# Patient Record
Sex: FEMALE | Race: WHITE
[De-identification: names, ages, dates, MRNs, and addresses within clinical notes are randomized per-mention and may not be internally consistent; named-entity substitution may affect disease eponyms.]

---

## 2021-12-14 ENCOUNTER — HOSPITAL ENCOUNTER (OUTPATIENT)
Age: 41
End: 2021-12-14
Payer: COMMERCIAL

## 2021-12-14 DIAGNOSIS — Z98.891: ICD-10-CM

## 2021-12-14 DIAGNOSIS — Z87.59: ICD-10-CM

## 2021-12-14 DIAGNOSIS — E06.3: ICD-10-CM

## 2021-12-14 DIAGNOSIS — O09.90: Primary | ICD-10-CM

## 2021-12-14 DIAGNOSIS — E28.2: ICD-10-CM

## 2021-12-14 DIAGNOSIS — O09.529: ICD-10-CM

## 2021-12-14 DIAGNOSIS — Z3A.00: ICD-10-CM

## 2021-12-14 DIAGNOSIS — N96: ICD-10-CM

## 2021-12-14 DIAGNOSIS — O26.899: ICD-10-CM

## 2021-12-14 DIAGNOSIS — F32.A: ICD-10-CM

## 2021-12-14 DIAGNOSIS — Z67.91: ICD-10-CM

## 2021-12-14 LAB
AMPHET UR-MCNC: NEGATIVE NG/ML
BARBITURATE URINE VISTA: NEGATIVE
BENZODIAZEPINE URINE VISTA: NEGATIVE
COCAINE URINE VISTA: NEGATIVE
DEPRECATED RDW RBC: 46.9 FL (ref 35.1–43.9)
DRUG CONFIRMATION TO FOLLOW?: (no result)
ECSTACY URINE VISTA: NEGATIVE
ERYTHROCYTE [DISTWIDTH] IN BLOOD: 13.7 % (ref 11.6–14.6)
HCT VFR BLD AUTO: 37.6 % (ref 37–47)
HEMOGLOBIN: 12.5 G/DL (ref 12–15)
HGB BLD-MCNC: 12.5 G/DL (ref 12–15)
IMMATURE GRANULOCYTES COUNT: 0.05 X10^3/UL (ref 0–0)
MCV RBC: 91.7 FL (ref 81–99)
MEAN CORP HGB CONC: 33.2 G/DL (ref 32–36)
MEAN PLATELET VOL.: 8.3 FL (ref 6.2–12)
METHADONE URINE VISTA: NEGATIVE
NRBC FLAGGED BY ANALYZER: 0 % (ref 0–5)
PCP UR QL: NEGATIVE
PH UR: 5 [PH]
PLATELET # BLD: 375 K/MM3 (ref 150–450)
PLATELET COUNT: 375 K/MM3 (ref 150–450)
RBC # BLD AUTO: 4.1 M/MM3 (ref 4.2–5.4)
RBC DISTRIBUTION WIDTH CV: 13.7 % (ref 11.6–14.6)
RBC DISTRIBUTION WIDTH SD: 46.9 FL (ref 35.1–43.9)
THC URINE VISTA: NEGATIVE
WBC # BLD AUTO: 7.9 K/MM3 (ref 4.4–11)
WHITE BLOOD COUNT: 7.9 K/MM3 (ref 4.4–11)

## 2021-12-14 PROCEDURE — 86901 BLOOD TYPING SEROLOGIC RH(D): CPT

## 2021-12-14 PROCEDURE — 36415 COLL VENOUS BLD VENIPUNCTURE: CPT

## 2021-12-14 PROCEDURE — 87340 HEPATITIS B SURFACE AG IA: CPT

## 2021-12-14 PROCEDURE — 87088 URINE BACTERIA CULTURE: CPT

## 2021-12-14 PROCEDURE — 86762 RUBELLA ANTIBODY: CPT

## 2021-12-14 PROCEDURE — 86900 BLOOD TYPING SEROLOGIC ABO: CPT

## 2021-12-14 PROCEDURE — 84443 ASSAY THYROID STIM HORMONE: CPT

## 2021-12-14 PROCEDURE — 86146 BETA-2 GLYCOPROTEIN ANTIBODY: CPT

## 2021-12-14 PROCEDURE — 84439 ASSAY OF FREE THYROXINE: CPT

## 2021-12-14 PROCEDURE — 86803 HEPATITIS C AB TEST: CPT

## 2021-12-14 PROCEDURE — 87624 HPV HI-RISK TYP POOLED RSLT: CPT

## 2021-12-14 PROCEDURE — 86376 MICROSOMAL ANTIBODY EACH: CPT

## 2021-12-14 PROCEDURE — 87086 URINE CULTURE/COLONY COUNT: CPT

## 2021-12-14 PROCEDURE — 80307 DRUG TEST PRSMV CHEM ANLYZR: CPT

## 2021-12-14 PROCEDURE — 86703 HIV-1/HIV-2 1 RESULT ANTBDY: CPT

## 2021-12-14 PROCEDURE — 86147 CARDIOLIPIN ANTIBODY EA IG: CPT

## 2021-12-14 PROCEDURE — G0145 SCR C/V CYTO,THINLAYER,RESCR: HCPCS

## 2021-12-14 PROCEDURE — 87491 CHLMYD TRACH DNA AMP PROBE: CPT

## 2021-12-14 PROCEDURE — 86800 THYROGLOBULIN ANTIBODY: CPT

## 2021-12-14 PROCEDURE — 88175 CYTOPATH C/V AUTO FLUID REDO: CPT

## 2021-12-14 PROCEDURE — 85025 COMPLETE CBC W/AUTO DIFF WBC: CPT

## 2021-12-14 PROCEDURE — 86780 TREPONEMA PALLIDUM: CPT

## 2021-12-14 PROCEDURE — 86850 RBC ANTIBODY SCREEN: CPT

## 2021-12-14 PROCEDURE — 87591 N.GONORRHOEAE DNA AMP PROB: CPT

## 2021-12-16 LAB
ANTI-CARDIOLIPIN AB, IGA, QN: < 9 APL U/ML (ref 0–11)
ANTI-CARDIOLIPIN AB, IGG, QN: < 9 GPL U/ML (ref 0–14)
ANTI-CARDIOLIPIN AB, IGM, QN: < 9 MPL U/ML (ref 0–12)
B2 GLYCOPROT1 IGA SER QL: <9 (ref 0–25)
B2 GLYCOPROT1 IGG SER QL: <9 (ref 0–20)
B2 GLYCOPROT1 IGM SER QL: <9 (ref 0–32)
CARDIOLIPIN IGA SERPL-ACNC: < 9 APL U/ML (ref 0–11)
CARDIOLIPIN IGG SER IA-ACNC: < 9 GPL U/ML (ref 0–14)
DILUTE RUSSELL VIPER VENOM: 29.4 SEC (ref 0–47)
PTT-LA: 30.4 SEC (ref 0–51.9)
SCREEN DRVVT: 29.4 SEC (ref 0–47)

## 2022-01-04 ENCOUNTER — HOSPITAL ENCOUNTER (OUTPATIENT)
Age: 42
Discharge: ANOTHER HOSPITAL | End: 2022-01-04
Payer: COMMERCIAL

## 2022-01-04 DIAGNOSIS — E06.3: Primary | ICD-10-CM

## 2022-01-04 PROCEDURE — 84443 ASSAY THYROID STIM HORMONE: CPT

## 2022-01-04 PROCEDURE — 36415 COLL VENOUS BLD VENIPUNCTURE: CPT

## 2022-01-04 PROCEDURE — 85025 COMPLETE CBC W/AUTO DIFF WBC: CPT

## 2022-01-31 ENCOUNTER — HOSPITAL ENCOUNTER (OUTPATIENT)
Dept: HOSPITAL 100 - PAVLAB | Age: 42
Discharge: ANOTHER HOSPITAL | End: 2022-01-31
Payer: COMMERCIAL

## 2022-01-31 DIAGNOSIS — E28.2: Primary | ICD-10-CM

## 2022-01-31 LAB
DEPRECATED RDW RBC: 42.6 FL (ref 35.1–43.9)
ERYTHROCYTE [DISTWIDTH] IN BLOOD: 12.6 % (ref 11.6–14.6)
GLUCOSE 1H P 50 G GLC PO SERPL-MCNC: 62 MG/DL (ref 70–140)
HCT VFR BLD AUTO: 39.8 % (ref 37–47)
HEMOGLOBIN: 13.2 G/DL (ref 12–15)
HGB BLD-MCNC: 13.2 G/DL (ref 12–15)
IMMATURE GRANULOCYTES COUNT: 0.06 X10^3/UL (ref 0–0)
MCV RBC: 91.9 FL (ref 81–99)
MEAN CORP HGB CONC: 33.2 G/DL (ref 32–36)
MEAN PLATELET VOL.: 8.4 FL (ref 6.2–12)
NRBC FLAGGED BY ANALYZER: 0 % (ref 0–5)
PLATELET # BLD: 368 K/MM3 (ref 150–450)
PLATELET COUNT: 368 K/MM3 (ref 150–450)
RBC # BLD AUTO: 4.33 M/MM3 (ref 4.2–5.4)
RBC DISTRIBUTION WIDTH CV: 12.6 % (ref 11.6–14.6)
RBC DISTRIBUTION WIDTH SD: 42.6 FL (ref 35.1–43.9)
WBC # BLD AUTO: 10.3 K/MM3 (ref 4.4–11)
WHITE BLOOD COUNT: 10.3 K/MM3 (ref 4.4–11)

## 2022-01-31 PROCEDURE — 82950 GLUCOSE TEST: CPT

## 2022-01-31 PROCEDURE — 36415 COLL VENOUS BLD VENIPUNCTURE: CPT

## 2022-03-03 ENCOUNTER — HOSPITAL ENCOUNTER (OUTPATIENT)
Dept: HOSPITAL 100 - PAVLAB | Age: 42
Discharge: HOME | End: 2022-03-03
Payer: COMMERCIAL

## 2022-03-03 DIAGNOSIS — E06.3: Primary | ICD-10-CM

## 2022-03-03 PROCEDURE — 36415 COLL VENOUS BLD VENIPUNCTURE: CPT

## 2022-03-03 PROCEDURE — 84443 ASSAY THYROID STIM HORMONE: CPT

## 2022-03-03 PROCEDURE — 84439 ASSAY OF FREE THYROXINE: CPT

## 2022-04-29 ENCOUNTER — HOSPITAL ENCOUNTER (OUTPATIENT)
Dept: HOSPITAL 100 - LAB | Age: 42
Discharge: HOME | End: 2022-04-29
Payer: COMMERCIAL

## 2022-04-29 DIAGNOSIS — O99.280: ICD-10-CM

## 2022-04-29 DIAGNOSIS — Z3A.00: ICD-10-CM

## 2022-04-29 DIAGNOSIS — E06.3: ICD-10-CM

## 2022-04-29 DIAGNOSIS — O09.90: Primary | ICD-10-CM

## 2022-04-29 LAB
DEPRECATED RDW RBC: 45.8 FL (ref 35.1–43.9)
ERYTHROCYTE [DISTWIDTH] IN BLOOD: 13.3 % (ref 11.6–14.6)
GLUCOSE 1H P 50 G GLC PO SERPL-MCNC: 124 MG/DL (ref 70–140)
HCT VFR BLD AUTO: 36 % (ref 37–47)
HEMOGLOBIN: 12.1 G/DL (ref 12–15)
HGB BLD-MCNC: 12.1 G/DL (ref 12–15)
IMMATURE GRANULOCYTES COUNT: 0.12 X10^3/UL (ref 0–0)
MCV RBC: 94 FL (ref 81–99)
MEAN CORP HGB CONC: 33.6 G/DL (ref 32–36)
MEAN PLATELET VOL.: 8.5 FL (ref 6.2–12)
NRBC FLAGGED BY ANALYZER: 0 % (ref 0–5)
PLATELET # BLD: 362 K/MM3 (ref 150–450)
PLATELET COUNT: 362 K/MM3 (ref 150–450)
RBC # BLD AUTO: 3.83 M/MM3 (ref 4.2–5.4)
RBC DISTRIBUTION WIDTH CV: 13.3 % (ref 11.6–14.6)
RBC DISTRIBUTION WIDTH SD: 45.8 FL (ref 35.1–43.9)
WBC # BLD AUTO: 10.6 K/MM3 (ref 4.4–11)
WHITE BLOOD COUNT: 10.6 K/MM3 (ref 4.4–11)

## 2022-04-29 PROCEDURE — 86850 RBC ANTIBODY SCREEN: CPT

## 2022-04-29 PROCEDURE — 82950 GLUCOSE TEST: CPT

## 2022-04-29 PROCEDURE — 84443 ASSAY THYROID STIM HORMONE: CPT

## 2022-04-29 PROCEDURE — 86901 BLOOD TYPING SEROLOGIC RH(D): CPT

## 2022-04-29 PROCEDURE — 86900 BLOOD TYPING SEROLOGIC ABO: CPT

## 2022-04-29 PROCEDURE — 85025 COMPLETE CBC W/AUTO DIFF WBC: CPT

## 2022-04-29 PROCEDURE — 36415 COLL VENOUS BLD VENIPUNCTURE: CPT

## 2022-05-13 ENCOUNTER — HOSPITAL ENCOUNTER (OUTPATIENT)
Age: 42
Discharge: HOME | End: 2022-05-13
Payer: COMMERCIAL

## 2022-05-13 DIAGNOSIS — O16.9: Primary | ICD-10-CM

## 2022-05-13 DIAGNOSIS — Z3A.00: ICD-10-CM

## 2022-05-13 LAB
ALANINE AMINOTRANSFER ALT/SGPT: 17 U/L (ref 13–56)
ALBUMIN SERPL-MCNC: 2.7 G/DL (ref 3.2–5)
ALKALINE PHOSPHATASE: 107 U/L (ref 45–117)
ANION GAP: 7 (ref 5–15)
AST(SGOT): 12 U/L (ref 15–37)
BUN SERPL-MCNC: 6 MG/DL (ref 7–18)
BUN/CREAT RATIO: 12.7 RATIO (ref 10–20)
CALCIUM SERPL-MCNC: 8.9 MG/DL (ref 8.5–10.1)
CARBON DIOXIDE: 24 MMOL/L (ref 21–32)
CHLORIDE: 105 MMOL/L (ref 98–107)
CREAT UR-MCNC: 17 MG/DL
DEPRECATED RDW RBC: 44.7 FL (ref 35.1–43.9)
ERYTHROCYTE [DISTWIDTH] IN BLOOD: 13.1 % (ref 11.6–14.6)
EST GLOM FILT RATE - AFR AMER: 187 ML/MIN (ref 60–?)
GLOBULIN: 4.4 G/DL (ref 2.2–4.2)
GLUCOSE: 87 MG/DL (ref 74–106)
HCT VFR BLD AUTO: 37.9 % (ref 37–47)
HEMOGLOBIN: 12.7 G/DL (ref 12–15)
HGB BLD-MCNC: 12.7 G/DL (ref 12–15)
IMMATURE GRANULOCYTES COUNT: 0.17 X10^3/UL (ref 0–0)
MCV RBC: 93.1 FL (ref 81–99)
MEAN CORP HGB CONC: 33.5 G/DL (ref 32–36)
MEAN PLATELET VOL.: 8.6 FL (ref 6.2–12)
NRBC FLAGGED BY ANALYZER: 0 % (ref 0–5)
PLATELET # BLD: 393 K/MM3 (ref 150–450)
PLATELET COUNT: 393 K/MM3 (ref 150–450)
POTASSIUM: 3.7 MMOL/L (ref 3.5–5.1)
PROT UR-MCNC: < 6 MG/DL (ref ?–11.9)
PROT/CREAT UR: (no result) MG/G CRE (ref 0–200)
RBC # BLD AUTO: 4.07 M/MM3 (ref 4.2–5.4)
RBC DISTRIBUTION WIDTH CV: 13.1 % (ref 11.6–14.6)
RBC DISTRIBUTION WIDTH SD: 44.7 FL (ref 35.1–43.9)
WBC # BLD AUTO: 11.5 K/MM3 (ref 4.4–11)
WHITE BLOOD COUNT: 11.5 K/MM3 (ref 4.4–11)

## 2022-05-13 PROCEDURE — 82570 ASSAY OF URINE CREATININE: CPT

## 2022-05-13 PROCEDURE — 80053 COMPREHEN METABOLIC PANEL: CPT

## 2022-05-13 PROCEDURE — 36415 COLL VENOUS BLD VENIPUNCTURE: CPT

## 2022-05-13 PROCEDURE — 84156 ASSAY OF PROTEIN URINE: CPT

## 2022-05-13 PROCEDURE — 85025 COMPLETE CBC W/AUTO DIFF WBC: CPT

## 2022-06-10 ENCOUNTER — HOSPITAL ENCOUNTER (OUTPATIENT)
Age: 42
Discharge: HOME | End: 2022-06-10
Payer: COMMERCIAL

## 2022-06-10 DIAGNOSIS — O16.3: Primary | ICD-10-CM

## 2022-06-10 DIAGNOSIS — Z3A.00: ICD-10-CM

## 2022-06-10 LAB
ALANINE AMINOTRANSFER ALT/SGPT: 18 U/L (ref 13–56)
ALBUMIN SERPL-MCNC: 2.6 G/DL (ref 3.2–5)
ALKALINE PHOSPHATASE: 124 U/L (ref 45–117)
ANION GAP: 6 (ref 5–15)
AST(SGOT): 10 U/L (ref 15–37)
BUN SERPL-MCNC: 6 MG/DL (ref 7–18)
BUN/CREAT RATIO: 12.2 RATIO (ref 10–20)
CALCIUM SERPL-MCNC: 8.8 MG/DL (ref 8.5–10.1)
CARBON DIOXIDE: 25 MMOL/L (ref 21–32)
CHLORIDE: 105 MMOL/L (ref 98–107)
CREAT UR-MCNC: 17.5 MG/DL
DEPRECATED RDW RBC: 46.2 FL (ref 35.1–43.9)
ERYTHROCYTE [DISTWIDTH] IN BLOOD: 13.5 % (ref 11.6–14.6)
EST GLOM FILT RATE - AFR AMER: 179 ML/MIN (ref 60–?)
GLOBULIN: 4.3 G/DL (ref 2.2–4.2)
GLUCOSE: 89 MG/DL (ref 74–106)
HCT VFR BLD AUTO: 39.6 % (ref 37–47)
HEMOGLOBIN: 13.1 G/DL (ref 12–15)
HGB BLD-MCNC: 13.1 G/DL (ref 12–15)
IMMATURE GRANULOCYTES COUNT: 0.23 X10^3/UL (ref 0–0)
MCV RBC: 94.1 FL (ref 81–99)
MEAN CORP HGB CONC: 33.1 G/DL (ref 32–36)
MEAN PLATELET VOL.: 8.5 FL (ref 6.2–12)
NRBC FLAGGED BY ANALYZER: 0 % (ref 0–5)
PLATELET # BLD: 367 K/MM3 (ref 150–450)
PLATELET COUNT: 367 K/MM3 (ref 150–450)
POTASSIUM: 3.6 MMOL/L (ref 3.5–5.1)
PROT UR-MCNC: < 6 MG/DL (ref ?–11.9)
PROT/CREAT UR: 286 MG/G CRE (ref 0–200)
RBC # BLD AUTO: 4.21 M/MM3 (ref 4.2–5.4)
RBC DISTRIBUTION WIDTH CV: 13.5 % (ref 11.6–14.6)
RBC DISTRIBUTION WIDTH SD: 46.2 FL (ref 35.1–43.9)
WBC # BLD AUTO: 13.8 K/MM3 (ref 4.4–11)
WHITE BLOOD COUNT: 13.8 K/MM3 (ref 4.4–11)

## 2022-06-10 PROCEDURE — 82570 ASSAY OF URINE CREATININE: CPT

## 2022-06-10 PROCEDURE — 85025 COMPLETE CBC W/AUTO DIFF WBC: CPT

## 2022-06-10 PROCEDURE — 36415 COLL VENOUS BLD VENIPUNCTURE: CPT

## 2022-06-10 PROCEDURE — 80053 COMPREHEN METABOLIC PANEL: CPT

## 2022-06-10 PROCEDURE — 84156 ASSAY OF PROTEIN URINE: CPT

## 2022-06-17 ENCOUNTER — HOSPITAL ENCOUNTER (OUTPATIENT)
Dept: HOSPITAL 100 - PAVLAB | Age: 42
Discharge: HOME | End: 2022-06-17
Payer: COMMERCIAL

## 2022-06-17 DIAGNOSIS — O09.522: Primary | ICD-10-CM

## 2022-06-17 DIAGNOSIS — Z3A.00: ICD-10-CM

## 2022-06-17 LAB
ALANINE AMINOTRANSFER ALT/SGPT: 18 U/L (ref 13–56)
ALBUMIN SERPL-MCNC: 2.5 G/DL (ref 3.2–5)
ALKALINE PHOSPHATASE: 129 U/L (ref 45–117)
ANION GAP: 6 (ref 5–15)
AST(SGOT): 14 U/L (ref 15–37)
BUN SERPL-MCNC: 6 MG/DL (ref 7–18)
BUN/CREAT RATIO: 11.1 RATIO (ref 10–20)
CALCIUM SERPL-MCNC: 8.9 MG/DL (ref 8.5–10.1)
CARBON DIOXIDE: 27 MMOL/L (ref 21–32)
CHLORIDE: 106 MMOL/L (ref 98–107)
CREAT UR-MCNC: 65.3 MG/DL
DEPRECATED RDW RBC: 46.5 FL (ref 35.1–43.9)
ERYTHROCYTE [DISTWIDTH] IN BLOOD: 13.7 % (ref 11.6–14.6)
EST GLOM FILT RATE - AFR AMER: 161 ML/MIN (ref 60–?)
GLOBULIN: 4.3 G/DL (ref 2.2–4.2)
GLUCOSE: 122 MG/DL (ref 74–106)
HCT VFR BLD AUTO: 39.4 % (ref 37–47)
HEMOGLOBIN: 13.1 G/DL (ref 12–15)
HGB BLD-MCNC: 13.1 G/DL (ref 12–15)
IMMATURE GRANULOCYTES COUNT: 0.19 X10^3/UL (ref 0–0)
MCV RBC: 93.8 FL (ref 81–99)
MEAN CORP HGB CONC: 33.2 G/DL (ref 32–36)
MEAN PLATELET VOL.: 8.5 FL (ref 6.2–12)
NRBC FLAGGED BY ANALYZER: 0 % (ref 0–5)
PLATELET # BLD: 386 K/MM3 (ref 150–450)
PLATELET COUNT: 386 K/MM3 (ref 150–450)
POTASSIUM: 3.4 MMOL/L (ref 3.5–5.1)
PROT UR-MCNC: 15.2 MG/DL (ref ?–11.9)
PROT/CREAT UR: 233 MG/G CRE (ref 0–200)
RBC # BLD AUTO: 4.2 M/MM3 (ref 4.2–5.4)
RBC DISTRIBUTION WIDTH CV: 13.7 % (ref 11.6–14.6)
RBC DISTRIBUTION WIDTH SD: 46.5 FL (ref 35.1–43.9)
WBC # BLD AUTO: 11.4 K/MM3 (ref 4.4–11)
WHITE BLOOD COUNT: 11.4 K/MM3 (ref 4.4–11)

## 2022-06-17 PROCEDURE — 82570 ASSAY OF URINE CREATININE: CPT

## 2022-06-17 PROCEDURE — 84156 ASSAY OF PROTEIN URINE: CPT

## 2022-06-17 PROCEDURE — 80053 COMPREHEN METABOLIC PANEL: CPT

## 2022-06-17 PROCEDURE — 36415 COLL VENOUS BLD VENIPUNCTURE: CPT

## 2022-06-17 PROCEDURE — 85025 COMPLETE CBC W/AUTO DIFF WBC: CPT

## 2022-06-24 ENCOUNTER — HOSPITAL ENCOUNTER (INPATIENT)
Age: 42
LOS: 3 days | Discharge: HOME | End: 2022-06-27
Payer: COMMERCIAL

## 2022-06-24 VITALS
RESPIRATION RATE: 16 BRPM | HEART RATE: 112 BPM | OXYGEN SATURATION: 95 % | DIASTOLIC BLOOD PRESSURE: 38 MMHG | SYSTOLIC BLOOD PRESSURE: 61 MMHG

## 2022-06-24 VITALS — DIASTOLIC BLOOD PRESSURE: 93 MMHG | SYSTOLIC BLOOD PRESSURE: 174 MMHG | HEART RATE: 87 BPM

## 2022-06-24 VITALS — DIASTOLIC BLOOD PRESSURE: 90 MMHG | SYSTOLIC BLOOD PRESSURE: 137 MMHG

## 2022-06-24 VITALS
OXYGEN SATURATION: 95 % | RESPIRATION RATE: 16 BRPM | TEMPERATURE: 97.16 F | HEART RATE: 111 BPM | DIASTOLIC BLOOD PRESSURE: 55 MMHG | SYSTOLIC BLOOD PRESSURE: 95 MMHG

## 2022-06-24 VITALS
OXYGEN SATURATION: 95 % | SYSTOLIC BLOOD PRESSURE: 61 MMHG | TEMPERATURE: 97.16 F | RESPIRATION RATE: 16 BRPM | DIASTOLIC BLOOD PRESSURE: 38 MMHG | HEART RATE: 112 BPM

## 2022-06-24 VITALS
TEMPERATURE: 97.1 F | HEART RATE: 90 BPM | RESPIRATION RATE: 16 BRPM | OXYGEN SATURATION: 97 % | SYSTOLIC BLOOD PRESSURE: 139 MMHG | DIASTOLIC BLOOD PRESSURE: 103 MMHG

## 2022-06-24 VITALS
TEMPERATURE: 97.1 F | RESPIRATION RATE: 16 BRPM | HEART RATE: 110 BPM | OXYGEN SATURATION: 95 % | SYSTOLIC BLOOD PRESSURE: 102 MMHG | DIASTOLIC BLOOD PRESSURE: 53 MMHG

## 2022-06-24 VITALS
OXYGEN SATURATION: 96 % | RESPIRATION RATE: 16 BRPM | HEART RATE: 95 BPM | TEMPERATURE: 97.52 F | DIASTOLIC BLOOD PRESSURE: 75 MMHG | SYSTOLIC BLOOD PRESSURE: 137 MMHG

## 2022-06-24 VITALS
DIASTOLIC BLOOD PRESSURE: 50 MMHG | OXYGEN SATURATION: 95 % | TEMPERATURE: 97.1 F | SYSTOLIC BLOOD PRESSURE: 90 MMHG | RESPIRATION RATE: 16 BRPM | HEART RATE: 110 BPM

## 2022-06-24 VITALS — SYSTOLIC BLOOD PRESSURE: 178 MMHG | DIASTOLIC BLOOD PRESSURE: 97 MMHG | HEART RATE: 88 BPM

## 2022-06-24 VITALS
OXYGEN SATURATION: 97 % | HEART RATE: 104 BPM | TEMPERATURE: 97.5 F | DIASTOLIC BLOOD PRESSURE: 90 MMHG | RESPIRATION RATE: 16 BRPM | SYSTOLIC BLOOD PRESSURE: 137 MMHG

## 2022-06-24 VITALS
TEMPERATURE: 97.9 F | SYSTOLIC BLOOD PRESSURE: 125 MMHG | HEART RATE: 98 BPM | DIASTOLIC BLOOD PRESSURE: 75 MMHG | RESPIRATION RATE: 18 BRPM | OXYGEN SATURATION: 98 %

## 2022-06-24 VITALS — SYSTOLIC BLOOD PRESSURE: 152 MMHG | HEART RATE: 85 BPM | DIASTOLIC BLOOD PRESSURE: 92 MMHG

## 2022-06-24 VITALS
SYSTOLIC BLOOD PRESSURE: 118 MMHG | DIASTOLIC BLOOD PRESSURE: 69 MMHG | TEMPERATURE: 98.9 F | RESPIRATION RATE: 16 BRPM | HEART RATE: 102 BPM | OXYGEN SATURATION: 96 %

## 2022-06-24 VITALS — HEART RATE: 87 BPM | SYSTOLIC BLOOD PRESSURE: 162 MMHG | DIASTOLIC BLOOD PRESSURE: 95 MMHG

## 2022-06-24 VITALS — DIASTOLIC BLOOD PRESSURE: 87 MMHG | SYSTOLIC BLOOD PRESSURE: 133 MMHG | HEART RATE: 93 BPM

## 2022-06-24 VITALS
OXYGEN SATURATION: 96 % | TEMPERATURE: 97.16 F | DIASTOLIC BLOOD PRESSURE: 55 MMHG | SYSTOLIC BLOOD PRESSURE: 95 MMHG | HEART RATE: 117 BPM | RESPIRATION RATE: 16 BRPM

## 2022-06-24 VITALS
DIASTOLIC BLOOD PRESSURE: 68 MMHG | TEMPERATURE: 97.5 F | OXYGEN SATURATION: 96 % | RESPIRATION RATE: 16 BRPM | SYSTOLIC BLOOD PRESSURE: 113 MMHG | HEART RATE: 101 BPM

## 2022-06-24 VITALS
HEART RATE: 96 BPM | DIASTOLIC BLOOD PRESSURE: 67 MMHG | TEMPERATURE: 97.52 F | OXYGEN SATURATION: 96 % | RESPIRATION RATE: 16 BRPM | SYSTOLIC BLOOD PRESSURE: 137 MMHG

## 2022-06-24 VITALS
SYSTOLIC BLOOD PRESSURE: 115 MMHG | TEMPERATURE: 97.5 F | DIASTOLIC BLOOD PRESSURE: 90 MMHG | RESPIRATION RATE: 16 BRPM | HEART RATE: 100 BPM | OXYGEN SATURATION: 97 %

## 2022-06-24 VITALS — SYSTOLIC BLOOD PRESSURE: 139 MMHG | HEART RATE: 90 BPM | DIASTOLIC BLOOD PRESSURE: 103 MMHG

## 2022-06-24 VITALS
DIASTOLIC BLOOD PRESSURE: 70 MMHG | RESPIRATION RATE: 16 BRPM | OXYGEN SATURATION: 98 % | TEMPERATURE: 99.14 F | SYSTOLIC BLOOD PRESSURE: 110 MMHG | HEART RATE: 108 BPM

## 2022-06-24 VITALS
HEART RATE: 87 BPM | RESPIRATION RATE: 16 BRPM | DIASTOLIC BLOOD PRESSURE: 93 MMHG | SYSTOLIC BLOOD PRESSURE: 174 MMHG | OXYGEN SATURATION: 97 % | TEMPERATURE: 97.52 F

## 2022-06-24 VITALS — HEART RATE: 87 BPM | SYSTOLIC BLOOD PRESSURE: 137 MMHG | DIASTOLIC BLOOD PRESSURE: 90 MMHG

## 2022-06-24 VITALS — SYSTOLIC BLOOD PRESSURE: 159 MMHG | HEART RATE: 85 BPM | DIASTOLIC BLOOD PRESSURE: 93 MMHG

## 2022-06-24 VITALS — HEART RATE: 86 BPM | DIASTOLIC BLOOD PRESSURE: 93 MMHG | SYSTOLIC BLOOD PRESSURE: 153 MMHG

## 2022-06-24 VITALS — BODY MASS INDEX: 41.4 KG/M2

## 2022-06-24 VITALS — HEART RATE: 99 BPM | SYSTOLIC BLOOD PRESSURE: 132 MMHG | DIASTOLIC BLOOD PRESSURE: 88 MMHG

## 2022-06-24 VITALS — OXYGEN SATURATION: 99 %

## 2022-06-24 DIAGNOSIS — O34.219: ICD-10-CM

## 2022-06-24 DIAGNOSIS — Z30.2: ICD-10-CM

## 2022-06-24 DIAGNOSIS — E28.2: ICD-10-CM

## 2022-06-24 DIAGNOSIS — Z79.899: ICD-10-CM

## 2022-06-24 DIAGNOSIS — F32.A: ICD-10-CM

## 2022-06-24 DIAGNOSIS — Z87.59: ICD-10-CM

## 2022-06-24 DIAGNOSIS — I95.9: ICD-10-CM

## 2022-06-24 DIAGNOSIS — Z3A.36: ICD-10-CM

## 2022-06-24 DIAGNOSIS — O26.23: ICD-10-CM

## 2022-06-24 DIAGNOSIS — Z67.91: ICD-10-CM

## 2022-06-24 DIAGNOSIS — E06.3: ICD-10-CM

## 2022-06-24 DIAGNOSIS — K58.9: ICD-10-CM

## 2022-06-24 LAB
ALANINE AMINOTRANSFER ALT/SGPT: 16 U/L (ref 13–56)
AST(SGOT): 13 U/L (ref 15–37)
CREAT UR-MCNC: 14.6 MG/DL
DEPRECATED RDW RBC: 45.5 FL (ref 35.1–43.9)
ERYTHROCYTE [DISTWIDTH] IN BLOOD: 13.6 % (ref 11.6–14.6)
EST GLOM FILT RATE - AFR AMER: 202 ML/MIN (ref 60–?)
HCT VFR BLD AUTO: 37.7 % (ref 37–47)
HEMOGLOBIN: 12.9 G/DL (ref 12–15)
HGB BLD-MCNC: 12.9 G/DL (ref 12–15)
IMMATURE GRANULOCYTES COUNT: 0.19 X10^3/UL (ref 0–0)
MCV RBC: 91.5 FL (ref 81–99)
MEAN CORP HGB CONC: 34.2 G/DL (ref 32–36)
MEAN PLATELET VOL.: 8.8 FL (ref 6.2–12)
NRBC FLAGGED BY ANALYZER: 0 % (ref 0–5)
PATHOLOGY SPECIMEN OB: (no result)
PLATELET # BLD: 353 K/MM3 (ref 150–450)
PLATELET COUNT: 353 K/MM3 (ref 150–450)
PROT UR-MCNC: 7.1 MG/DL (ref ?–11.9)
PROT/CREAT UR: 486 MG/G CRE (ref 0–200)
RBC # BLD AUTO: 4.12 M/MM3 (ref 4.2–5.4)
RBC DISTRIBUTION WIDTH CV: 13.6 % (ref 11.6–14.6)
RBC DISTRIBUTION WIDTH SD: 45.5 FL (ref 35.1–43.9)
URATE SERPL-MCNC: 4 MG/DL (ref 2.6–6)
WBC # BLD AUTO: 11 K/MM3 (ref 4.4–11)
WHITE BLOOD COUNT: 11 K/MM3 (ref 4.4–11)

## 2022-06-24 PROCEDURE — 86900 BLOOD TYPING SEROLOGIC ABO: CPT

## 2022-06-24 PROCEDURE — 82570 ASSAY OF URINE CREATININE: CPT

## 2022-06-24 PROCEDURE — 86850 RBC ANTIBODY SCREEN: CPT

## 2022-06-24 PROCEDURE — 59025 FETAL NON-STRESS TEST: CPT

## 2022-06-24 PROCEDURE — 84156 ASSAY OF PROTEIN URINE: CPT

## 2022-06-24 PROCEDURE — 85027 COMPLETE CBC AUTOMATED: CPT

## 2022-06-24 PROCEDURE — 99251: CPT

## 2022-06-24 PROCEDURE — 84460 ALANINE AMINO (ALT) (SGPT): CPT

## 2022-06-24 PROCEDURE — 59050 FETAL MONITOR W/REPORT: CPT

## 2022-06-24 PROCEDURE — 86901 BLOOD TYPING SEROLOGIC RH(D): CPT

## 2022-06-24 PROCEDURE — G0378 HOSPITAL OBSERVATION PER HR: HCPCS

## 2022-06-24 PROCEDURE — 84550 ASSAY OF BLOOD/URIC ACID: CPT

## 2022-06-24 PROCEDURE — 82565 ASSAY OF CREATININE: CPT

## 2022-06-24 PROCEDURE — 85025 COMPLETE CBC W/AUTO DIFF WBC: CPT

## 2022-06-24 PROCEDURE — G0463 HOSPITAL OUTPT CLINIC VISIT: HCPCS

## 2022-06-24 PROCEDURE — 84450 TRANSFERASE (AST) (SGOT): CPT

## 2022-06-24 PROCEDURE — 88302 TISSUE EXAM BY PATHOLOGIST: CPT

## 2022-06-24 PROCEDURE — A4216 STERILE WATER/SALINE, 10 ML: HCPCS

## 2022-06-24 PROCEDURE — 87081 CULTURE SCREEN ONLY: CPT

## 2022-06-24 PROCEDURE — 99218: CPT

## 2022-06-25 VITALS
DIASTOLIC BLOOD PRESSURE: 71 MMHG | SYSTOLIC BLOOD PRESSURE: 125 MMHG | RESPIRATION RATE: 18 BRPM | HEART RATE: 82 BPM | TEMPERATURE: 98 F | OXYGEN SATURATION: 98 %

## 2022-06-25 VITALS
DIASTOLIC BLOOD PRESSURE: 77 MMHG | HEART RATE: 88 BPM | RESPIRATION RATE: 16 BRPM | SYSTOLIC BLOOD PRESSURE: 129 MMHG | TEMPERATURE: 97.3 F | OXYGEN SATURATION: 97 %

## 2022-06-25 VITALS — HEART RATE: 83 BPM | OXYGEN SATURATION: 95 %

## 2022-06-25 VITALS — RESPIRATION RATE: 16 BRPM | HEART RATE: 90 BPM | OXYGEN SATURATION: 98 %

## 2022-06-25 VITALS
HEART RATE: 84 BPM | DIASTOLIC BLOOD PRESSURE: 78 MMHG | RESPIRATION RATE: 18 BRPM | OXYGEN SATURATION: 94 % | SYSTOLIC BLOOD PRESSURE: 112 MMHG | TEMPERATURE: 97.34 F

## 2022-06-25 VITALS
HEART RATE: 80 BPM | OXYGEN SATURATION: 96 % | TEMPERATURE: 98.2 F | RESPIRATION RATE: 16 BRPM | DIASTOLIC BLOOD PRESSURE: 76 MMHG | SYSTOLIC BLOOD PRESSURE: 120 MMHG

## 2022-06-25 VITALS
SYSTOLIC BLOOD PRESSURE: 136 MMHG | RESPIRATION RATE: 14 BRPM | TEMPERATURE: 98.42 F | OXYGEN SATURATION: 98 % | HEART RATE: 97 BPM | DIASTOLIC BLOOD PRESSURE: 85 MMHG

## 2022-06-25 VITALS
TEMPERATURE: 96.98 F | OXYGEN SATURATION: 98 % | RESPIRATION RATE: 16 BRPM | DIASTOLIC BLOOD PRESSURE: 76 MMHG | SYSTOLIC BLOOD PRESSURE: 118 MMHG | HEART RATE: 87 BPM

## 2022-06-25 VITALS
OXYGEN SATURATION: 100 % | RESPIRATION RATE: 18 BRPM | SYSTOLIC BLOOD PRESSURE: 128 MMHG | DIASTOLIC BLOOD PRESSURE: 83 MMHG

## 2022-06-25 VITALS — HEART RATE: 102 BPM | OXYGEN SATURATION: 97 %

## 2022-06-25 VITALS — OXYGEN SATURATION: 99 % | HEART RATE: 86 BPM

## 2022-06-25 VITALS — DIASTOLIC BLOOD PRESSURE: 85 MMHG | HEART RATE: 97 BPM | SYSTOLIC BLOOD PRESSURE: 136 MMHG

## 2022-06-25 VITALS — OXYGEN SATURATION: 96 % | RESPIRATION RATE: 16 BRPM | HEART RATE: 93 BPM

## 2022-06-25 VITALS — OXYGEN SATURATION: 96 % | RESPIRATION RATE: 16 BRPM

## 2022-06-25 LAB
DEPRECATED RDW RBC: 48.1 FL (ref 35.1–43.9)
ERYTHROCYTE [DISTWIDTH] IN BLOOD: 14 % (ref 11.6–14.6)
HCT VFR BLD AUTO: 33.2 % (ref 37–47)
HEMOGLOBIN: 10.8 G/DL (ref 12–15)
HGB BLD-MCNC: 10.8 G/DL (ref 12–15)
MCV RBC: 95.1 FL (ref 81–99)
MEAN CORP HGB CONC: 32.5 G/DL (ref 32–36)
MEAN PLATELET VOL.: 8.5 FL (ref 6.2–12)
PLATELET # BLD: 279 K/MM3 (ref 150–450)
PLATELET COUNT: 279 K/MM3 (ref 150–450)
RBC # BLD AUTO: 3.49 M/MM3 (ref 4.2–5.4)
RBC DISTRIBUTION WIDTH CV: 14 % (ref 11.6–14.6)
RBC DISTRIBUTION WIDTH SD: 48.1 FL (ref 35.1–43.9)
WBC # BLD AUTO: 10.7 K/MM3 (ref 4.4–11)
WHITE BLOOD COUNT: 10.7 K/MM3 (ref 4.4–11)

## 2022-06-26 VITALS
OXYGEN SATURATION: 99 % | TEMPERATURE: 97.9 F | DIASTOLIC BLOOD PRESSURE: 85 MMHG | RESPIRATION RATE: 17 BRPM | HEART RATE: 121 BPM | SYSTOLIC BLOOD PRESSURE: 161 MMHG

## 2022-06-26 VITALS
RESPIRATION RATE: 16 BRPM | HEART RATE: 96 BPM | DIASTOLIC BLOOD PRESSURE: 78 MMHG | TEMPERATURE: 97.88 F | OXYGEN SATURATION: 97 % | SYSTOLIC BLOOD PRESSURE: 158 MMHG

## 2022-06-26 VITALS — DIASTOLIC BLOOD PRESSURE: 82 MMHG | HEART RATE: 115 BPM | SYSTOLIC BLOOD PRESSURE: 124 MMHG

## 2022-06-26 VITALS — DIASTOLIC BLOOD PRESSURE: 83 MMHG | SYSTOLIC BLOOD PRESSURE: 148 MMHG | HEART RATE: 80 BPM

## 2022-06-26 VITALS — HEART RATE: 109 BPM | DIASTOLIC BLOOD PRESSURE: 84 MMHG | SYSTOLIC BLOOD PRESSURE: 139 MMHG

## 2022-06-26 VITALS
SYSTOLIC BLOOD PRESSURE: 130 MMHG | DIASTOLIC BLOOD PRESSURE: 77 MMHG | HEART RATE: 98 BPM | TEMPERATURE: 98.24 F | OXYGEN SATURATION: 99 % | RESPIRATION RATE: 18 BRPM

## 2022-06-26 VITALS
SYSTOLIC BLOOD PRESSURE: 135 MMHG | RESPIRATION RATE: 16 BRPM | OXYGEN SATURATION: 96 % | TEMPERATURE: 98.6 F | DIASTOLIC BLOOD PRESSURE: 75 MMHG | HEART RATE: 107 BPM

## 2022-06-26 VITALS — HEART RATE: 108 BPM | SYSTOLIC BLOOD PRESSURE: 111 MMHG | DIASTOLIC BLOOD PRESSURE: 67 MMHG

## 2022-06-26 VITALS
DIASTOLIC BLOOD PRESSURE: 83 MMHG | TEMPERATURE: 97.88 F | HEART RATE: 80 BPM | SYSTOLIC BLOOD PRESSURE: 148 MMHG | RESPIRATION RATE: 16 BRPM

## 2022-06-26 VITALS
DIASTOLIC BLOOD PRESSURE: 84 MMHG | HEART RATE: 106 BPM | OXYGEN SATURATION: 99 % | RESPIRATION RATE: 15 BRPM | SYSTOLIC BLOOD PRESSURE: 139 MMHG | TEMPERATURE: 98.4 F

## 2022-06-26 VITALS — SYSTOLIC BLOOD PRESSURE: 133 MMHG | DIASTOLIC BLOOD PRESSURE: 86 MMHG | HEART RATE: 100 BPM

## 2022-06-26 VITALS
RESPIRATION RATE: 18 BRPM | HEART RATE: 100 BPM | TEMPERATURE: 97.8 F | DIASTOLIC BLOOD PRESSURE: 87 MMHG | SYSTOLIC BLOOD PRESSURE: 145 MMHG

## 2022-06-26 VITALS — DIASTOLIC BLOOD PRESSURE: 83 MMHG | HEART RATE: 109 BPM | SYSTOLIC BLOOD PRESSURE: 136 MMHG

## 2022-06-26 VITALS — DIASTOLIC BLOOD PRESSURE: 65 MMHG | SYSTOLIC BLOOD PRESSURE: 112 MMHG | HEART RATE: 106 BPM

## 2022-06-26 VITALS — HEART RATE: 112 BPM | SYSTOLIC BLOOD PRESSURE: 179 MMHG | DIASTOLIC BLOOD PRESSURE: 89 MMHG

## 2022-06-26 VITALS
TEMPERATURE: 98.24 F | SYSTOLIC BLOOD PRESSURE: 129 MMHG | RESPIRATION RATE: 18 BRPM | DIASTOLIC BLOOD PRESSURE: 82 MMHG | HEART RATE: 100 BPM

## 2022-06-26 VITALS — HEART RATE: 100 BPM | SYSTOLIC BLOOD PRESSURE: 112 MMHG | DIASTOLIC BLOOD PRESSURE: 65 MMHG

## 2022-06-26 VITALS — DIASTOLIC BLOOD PRESSURE: 85 MMHG | HEART RATE: 121 BPM | SYSTOLIC BLOOD PRESSURE: 161 MMHG

## 2022-06-26 VITALS — OXYGEN SATURATION: 98 %

## 2022-06-26 VITALS — SYSTOLIC BLOOD PRESSURE: 130 MMHG | DIASTOLIC BLOOD PRESSURE: 77 MMHG | OXYGEN SATURATION: 99 % | HEART RATE: 95 BPM

## 2022-06-26 VITALS — DIASTOLIC BLOOD PRESSURE: 99 MMHG | OXYGEN SATURATION: 97 % | SYSTOLIC BLOOD PRESSURE: 208 MMHG | HEART RATE: 100 BPM

## 2022-06-26 VITALS — DIASTOLIC BLOOD PRESSURE: 79 MMHG | SYSTOLIC BLOOD PRESSURE: 133 MMHG | HEART RATE: 98 BPM

## 2022-06-26 VITALS — SYSTOLIC BLOOD PRESSURE: 117 MMHG | HEART RATE: 108 BPM | DIASTOLIC BLOOD PRESSURE: 70 MMHG

## 2022-06-26 VITALS — SYSTOLIC BLOOD PRESSURE: 158 MMHG | DIASTOLIC BLOOD PRESSURE: 78 MMHG | HEART RATE: 85 BPM

## 2022-06-26 VITALS — DIASTOLIC BLOOD PRESSURE: 84 MMHG | HEART RATE: 89 BPM | SYSTOLIC BLOOD PRESSURE: 172 MMHG

## 2022-06-26 VITALS — DIASTOLIC BLOOD PRESSURE: 87 MMHG | SYSTOLIC BLOOD PRESSURE: 137 MMHG | HEART RATE: 106 BPM

## 2022-06-26 VITALS — HEART RATE: 100 BPM | DIASTOLIC BLOOD PRESSURE: 87 MMHG | SYSTOLIC BLOOD PRESSURE: 145 MMHG

## 2022-06-27 VITALS — SYSTOLIC BLOOD PRESSURE: 131 MMHG | HEART RATE: 111 BPM | DIASTOLIC BLOOD PRESSURE: 77 MMHG

## 2022-06-27 VITALS — HEART RATE: 85 BPM | DIASTOLIC BLOOD PRESSURE: 82 MMHG | SYSTOLIC BLOOD PRESSURE: 119 MMHG

## 2022-06-27 VITALS — SYSTOLIC BLOOD PRESSURE: 128 MMHG | DIASTOLIC BLOOD PRESSURE: 76 MMHG | HEART RATE: 118 BPM

## 2022-06-27 VITALS
RESPIRATION RATE: 18 BRPM | SYSTOLIC BLOOD PRESSURE: 146 MMHG | HEART RATE: 89 BPM | TEMPERATURE: 98.6 F | DIASTOLIC BLOOD PRESSURE: 82 MMHG

## 2022-06-27 VITALS
OXYGEN SATURATION: 97 % | DIASTOLIC BLOOD PRESSURE: 76 MMHG | RESPIRATION RATE: 16 BRPM | SYSTOLIC BLOOD PRESSURE: 128 MMHG | HEART RATE: 111 BPM | TEMPERATURE: 98.06 F

## 2022-06-27 VITALS — HEART RATE: 111 BPM | SYSTOLIC BLOOD PRESSURE: 131 MMHG | DIASTOLIC BLOOD PRESSURE: 77 MMHG

## 2022-06-27 VITALS — OXYGEN SATURATION: 96 % | DIASTOLIC BLOOD PRESSURE: 82 MMHG | HEART RATE: 105 BPM | SYSTOLIC BLOOD PRESSURE: 129 MMHG

## 2022-06-27 VITALS
HEART RATE: 105 BPM | SYSTOLIC BLOOD PRESSURE: 129 MMHG | TEMPERATURE: 97.6 F | DIASTOLIC BLOOD PRESSURE: 82 MMHG | OXYGEN SATURATION: 96 % | RESPIRATION RATE: 16 BRPM

## 2022-06-27 VITALS — DIASTOLIC BLOOD PRESSURE: 82 MMHG | SYSTOLIC BLOOD PRESSURE: 119 MMHG | RESPIRATION RATE: 18 BRPM | HEART RATE: 85 BPM

## 2022-06-27 VITALS — RESPIRATION RATE: 18 BRPM | DIASTOLIC BLOOD PRESSURE: 86 MMHG | HEART RATE: 100 BPM | SYSTOLIC BLOOD PRESSURE: 133 MMHG

## 2022-06-27 VITALS — DIASTOLIC BLOOD PRESSURE: 82 MMHG | SYSTOLIC BLOOD PRESSURE: 146 MMHG | HEART RATE: 89 BPM

## 2022-07-12 ENCOUNTER — HOSPITAL ENCOUNTER (OUTPATIENT)
Age: 42
Discharge: HOME | End: 2022-07-12
Payer: COMMERCIAL

## 2022-07-12 DIAGNOSIS — E06.3: Primary | ICD-10-CM

## 2022-07-12 PROCEDURE — 36415 COLL VENOUS BLD VENIPUNCTURE: CPT

## 2022-07-12 PROCEDURE — 84443 ASSAY THYROID STIM HORMONE: CPT

## 2022-07-12 PROCEDURE — 84439 ASSAY OF FREE THYROXINE: CPT

## 2022-08-04 ENCOUNTER — HOSPITAL ENCOUNTER (OUTPATIENT)
Dept: HOSPITAL 100 - PAVLAB | Age: 42
Discharge: HOME | End: 2022-08-04
Payer: COMMERCIAL

## 2022-08-04 DIAGNOSIS — E06.3: Primary | ICD-10-CM

## 2022-08-04 PROCEDURE — 36415 COLL VENOUS BLD VENIPUNCTURE: CPT

## 2022-08-04 PROCEDURE — 84439 ASSAY OF FREE THYROXINE: CPT

## 2022-08-04 PROCEDURE — 84443 ASSAY THYROID STIM HORMONE: CPT

## 2022-08-17 ENCOUNTER — HOSPITAL ENCOUNTER (OUTPATIENT)
Age: 42
Discharge: HOME | End: 2022-08-17
Payer: COMMERCIAL

## 2022-08-17 DIAGNOSIS — Z86.39: ICD-10-CM

## 2022-08-17 DIAGNOSIS — E55.9: ICD-10-CM

## 2022-08-17 DIAGNOSIS — Z34.90: Primary | ICD-10-CM

## 2022-08-17 LAB
ALANINE AMINOTRANSFER ALT/SGPT: 20 U/L (ref 13–56)
ALBUMIN SERPL-MCNC: 3.4 G/DL (ref 3.2–5)
ALKALINE PHOSPHATASE: 119 U/L (ref 45–117)
ANION GAP: 8 (ref 5–15)
AST(SGOT): 10 U/L (ref 15–37)
BUN SERPL-MCNC: 11 MG/DL (ref 7–18)
BUN/CREAT RATIO: 14.7 RATIO (ref 10–20)
CALCIUM SERPL-MCNC: 9.2 MG/DL (ref 8.5–10.1)
CARBON DIOXIDE: 27 MMOL/L (ref 21–32)
CHLORIDE: 106 MMOL/L (ref 98–107)
DEPRECATED RDW RBC: 42.1 FL (ref 35.1–43.9)
ERYTHROCYTE [DISTWIDTH] IN BLOOD: 12.5 % (ref 11.6–14.6)
EST GLOM FILT RATE - AFR AMER: 110 ML/MIN (ref 60–?)
FERRITIN SERPL-MCNC: 38 NG/ML (ref 8–252)
FREE T3: 3 PG/ML (ref 2.18–3.98)
GLOBULIN: 3.9 G/DL (ref 2.2–4.2)
GLUCOSE: 94 MG/DL (ref 74–106)
HCT VFR BLD AUTO: 38.3 % (ref 37–47)
HEMOGLOBIN: 12.5 G/DL (ref 12–15)
HGB BLD-MCNC: 12.5 G/DL (ref 12–15)
IMMATURE GRANULOCYTES COUNT: 0.04 X10^3/UL (ref 0–0)
IRON BINDING CAPACITY,TOTAL: 343 UG/DL (ref 250–450)
IRON SATN MFR SERPL: 19 % (ref 15–55)
IRON SPEC-MCNT: 65 UG/DL (ref 50–170)
MCV RBC: 91.6 FL (ref 81–99)
MEAN CORP HGB CONC: 32.6 G/DL (ref 32–36)
MEAN PLATELET VOL.: 8.5 FL (ref 6.2–12)
NRBC FLAGGED BY ANALYZER: 0 % (ref 0–5)
PLATELET # BLD: 438 K/MM3 (ref 150–450)
PLATELET COUNT: 438 K/MM3 (ref 150–450)
POTASSIUM: 3.6 MMOL/L (ref 3.5–5.1)
RBC # BLD AUTO: 4.18 M/MM3 (ref 4.2–5.4)
RBC DISTRIBUTION WIDTH CV: 12.5 % (ref 11.6–14.6)
RBC DISTRIBUTION WIDTH SD: 42.1 FL (ref 35.1–43.9)
VITAMIN D,25 HYDROXY: 41 NG/ML
WBC # BLD AUTO: 8.7 K/MM3 (ref 4.4–11)
WHITE BLOOD COUNT: 8.7 K/MM3 (ref 4.4–11)

## 2022-08-17 PROCEDURE — 80053 COMPREHEN METABOLIC PANEL: CPT

## 2022-08-17 PROCEDURE — 36415 COLL VENOUS BLD VENIPUNCTURE: CPT

## 2022-08-17 PROCEDURE — 82728 ASSAY OF FERRITIN: CPT

## 2022-08-17 PROCEDURE — 83550 IRON BINDING TEST: CPT

## 2022-08-17 PROCEDURE — 85025 COMPLETE CBC W/AUTO DIFF WBC: CPT

## 2022-08-17 PROCEDURE — 86800 THYROGLOBULIN ANTIBODY: CPT

## 2022-08-17 PROCEDURE — 84432 ASSAY OF THYROGLOBULIN: CPT

## 2022-08-17 PROCEDURE — 82306 VITAMIN D 25 HYDROXY: CPT

## 2022-08-17 PROCEDURE — 86376 MICROSOMAL ANTIBODY EACH: CPT

## 2022-08-17 PROCEDURE — 84439 ASSAY OF FREE THYROXINE: CPT

## 2022-08-17 PROCEDURE — 84443 ASSAY THYROID STIM HORMONE: CPT

## 2022-08-17 PROCEDURE — 84481 FREE ASSAY (FT-3): CPT

## 2022-08-17 PROCEDURE — 84482 T3 REVERSE: CPT

## 2022-08-17 PROCEDURE — 83540 ASSAY OF IRON: CPT

## 2022-10-25 ENCOUNTER — HOSPITAL ENCOUNTER (OUTPATIENT)
Dept: HOSPITAL 100 - LAB | Age: 42
Discharge: HOME | End: 2022-10-25
Payer: COMMERCIAL

## 2022-10-25 DIAGNOSIS — E61.1: ICD-10-CM

## 2022-10-25 DIAGNOSIS — E03.9: Primary | ICD-10-CM

## 2022-10-25 LAB
FERRITIN SERPL-MCNC: 40 NG/ML (ref 8–252)
FREE T3: 4.7 PG/ML (ref 2.18–3.98)
IRON BINDING CAPACITY,TOTAL: 345 UG/DL (ref 250–450)
IRON SPEC-MCNT: 75 UG/DL (ref 50–170)

## 2022-10-25 PROCEDURE — 83550 IRON BINDING TEST: CPT

## 2022-10-25 PROCEDURE — 82728 ASSAY OF FERRITIN: CPT

## 2022-10-25 PROCEDURE — 84482 T3 REVERSE: CPT

## 2022-10-25 PROCEDURE — 84443 ASSAY THYROID STIM HORMONE: CPT

## 2022-10-25 PROCEDURE — 84481 FREE ASSAY (FT-3): CPT

## 2022-10-25 PROCEDURE — 36415 COLL VENOUS BLD VENIPUNCTURE: CPT

## 2022-10-25 PROCEDURE — 83540 ASSAY OF IRON: CPT

## 2022-10-25 PROCEDURE — 84439 ASSAY OF FREE THYROXINE: CPT

## 2022-12-16 ENCOUNTER — HOSPITAL ENCOUNTER (OUTPATIENT)
Dept: HOSPITAL 100 - LAB | Age: 42
Discharge: HOME | End: 2022-12-16
Payer: COMMERCIAL

## 2022-12-16 DIAGNOSIS — E03.9: Primary | ICD-10-CM

## 2022-12-16 DIAGNOSIS — D50.9: ICD-10-CM

## 2022-12-16 LAB
FERRITIN SERPL-MCNC: 32 NG/ML (ref 8–252)
FREE T3: 2.9 PG/ML (ref 2.18–3.98)
IRON BINDING CAPACITY,TOTAL: 363 UG/DL (ref 250–450)
IRON SPEC-MCNT: 85 UG/DL (ref 50–170)

## 2022-12-16 PROCEDURE — 86376 MICROSOMAL ANTIBODY EACH: CPT

## 2022-12-16 PROCEDURE — 36415 COLL VENOUS BLD VENIPUNCTURE: CPT

## 2022-12-16 PROCEDURE — 82728 ASSAY OF FERRITIN: CPT

## 2022-12-16 PROCEDURE — 83550 IRON BINDING TEST: CPT

## 2022-12-16 PROCEDURE — 84443 ASSAY THYROID STIM HORMONE: CPT

## 2022-12-16 PROCEDURE — 83540 ASSAY OF IRON: CPT

## 2022-12-16 PROCEDURE — 84439 ASSAY OF FREE THYROXINE: CPT

## 2022-12-16 PROCEDURE — 84481 FREE ASSAY (FT-3): CPT

## 2022-12-16 PROCEDURE — 86800 THYROGLOBULIN ANTIBODY: CPT

## 2022-12-16 PROCEDURE — 84482 T3 REVERSE: CPT

## 2022-12-16 PROCEDURE — 84432 ASSAY OF THYROGLOBULIN: CPT

## 2023-02-23 ENCOUNTER — HOSPITAL ENCOUNTER (OUTPATIENT)
Dept: HOSPITAL 100 - LAB | Age: 43
Discharge: HOME | End: 2023-02-23
Payer: COMMERCIAL

## 2023-02-23 DIAGNOSIS — Z13.29: ICD-10-CM

## 2023-02-23 DIAGNOSIS — N92.0: Primary | ICD-10-CM

## 2023-02-23 LAB
DEPRECATED RDW RBC: 47.8 FL (ref 35.1–43.9)
ERYTHROCYTE [DISTWIDTH] IN BLOOD: 14.3 % (ref 11.6–14.6)
HCT VFR BLD AUTO: 42.2 % (ref 37–47)
HEMOGLOBIN: 13.5 G/DL (ref 12–15)
HGB BLD-MCNC: 13.5 G/DL (ref 12–15)
IMMATURE GRANULOCYTES COUNT: 0.04 X10^3/UL (ref 0–0)
MCV RBC: 90.6 FL (ref 81–99)
MEAN CORP HGB CONC: 32 G/DL (ref 32–36)
MEAN PLATELET VOL.: 8.5 FL (ref 6.2–12)
NRBC FLAGGED BY ANALYZER: 0 % (ref 0–5)
PLATELET # BLD: 426 K/MM3 (ref 150–450)
PLATELET COUNT: 426 K/MM3 (ref 150–450)
RBC # BLD AUTO: 4.66 M/MM3 (ref 4.2–5.4)
RBC DISTRIBUTION WIDTH CV: 14.3 % (ref 11.6–14.6)
RBC DISTRIBUTION WIDTH SD: 47.8 FL (ref 35.1–43.9)
WBC # BLD AUTO: 8.1 K/MM3 (ref 4.4–11)
WHITE BLOOD COUNT: 8.1 K/MM3 (ref 4.4–11)

## 2023-02-23 PROCEDURE — 84439 ASSAY OF FREE THYROXINE: CPT

## 2023-02-23 PROCEDURE — 36415 COLL VENOUS BLD VENIPUNCTURE: CPT

## 2023-02-23 PROCEDURE — 85025 COMPLETE CBC W/AUTO DIFF WBC: CPT

## 2023-02-23 PROCEDURE — 84443 ASSAY THYROID STIM HORMONE: CPT

## 2023-03-02 ENCOUNTER — HOSPITAL ENCOUNTER (OUTPATIENT)
Dept: HOSPITAL 100 - US | Age: 43
Discharge: HOME | End: 2023-03-02
Payer: COMMERCIAL

## 2023-03-02 DIAGNOSIS — N92.0: Primary | ICD-10-CM

## 2023-03-02 PROCEDURE — 76830 TRANSVAGINAL US NON-OB: CPT

## 2023-03-02 PROCEDURE — 76856 US EXAM PELVIC COMPLETE: CPT

## 2023-08-15 ENCOUNTER — HOSPITAL ENCOUNTER (OUTPATIENT)
Dept: HOSPITAL 100 - OPUS | Age: 43
Discharge: HOME | End: 2023-08-15
Payer: COMMERCIAL

## 2023-08-15 DIAGNOSIS — N92.0: ICD-10-CM

## 2023-08-15 DIAGNOSIS — Z12.31: Primary | ICD-10-CM

## 2023-08-15 PROCEDURE — 77067 SCR MAMMO BI INCL CAD: CPT

## 2023-08-15 PROCEDURE — 77063 BREAST TOMOSYNTHESIS BI: CPT

## 2023-08-15 PROCEDURE — 76830 TRANSVAGINAL US NON-OB: CPT

## 2023-08-15 PROCEDURE — 76856 US EXAM PELVIC COMPLETE: CPT

## 2023-09-05 ENCOUNTER — HOSPITAL ENCOUNTER (OUTPATIENT)
Dept: HOSPITAL 100 - LABSPEC | Age: 43
Discharge: HOME | End: 2023-09-05
Payer: COMMERCIAL

## 2023-09-05 DIAGNOSIS — N92.0: Primary | ICD-10-CM

## 2023-09-05 PROCEDURE — 88305 TISSUE EXAM BY PATHOLOGIST: CPT

## 2023-10-19 LAB
DEPRECATED RDW RBC: 46.3 FL (ref 35.1–43.9)
ERYTHROCYTE [DISTWIDTH] IN BLOOD: 13.7 % (ref 11.6–14.6)
HCT VFR BLD AUTO: 43.6 % (ref 37–47)
HEMOGLOBIN: 14.3 G/DL (ref 12–15)
HGB BLD-MCNC: 14.3 G/DL (ref 12–15)
MAGNESIUM: 2.7 MG/DL (ref 1.6–2.6)
MCV RBC: 91.2 FL (ref 81–99)
MEAN CORP HGB CONC: 32.8 G/DL (ref 32–36)
MEAN PLATELET VOL.: 8.3 FL (ref 6.2–12)
PLATELET # BLD: 475 K/MM3 (ref 150–450)
PLATELET COUNT: 475 K/MM3 (ref 150–450)
RBC # BLD AUTO: 4.78 M/MM3 (ref 4.2–5.4)
RBC DISTRIBUTION WIDTH CV: 13.7 % (ref 11.6–14.6)
RBC DISTRIBUTION WIDTH SD: 46.3 FL (ref 35.1–43.9)
WBC # BLD AUTO: 10.2 K/MM3 (ref 4.4–11)
WHITE BLOOD COUNT: 10.2 K/MM3 (ref 4.4–11)

## 2023-10-24 ENCOUNTER — HOSPITAL ENCOUNTER (OUTPATIENT)
Dept: HOSPITAL 100 - SDC | Age: 43
Discharge: HOME | End: 2023-10-24
Payer: COMMERCIAL

## 2023-10-24 VITALS
OXYGEN SATURATION: 97 % | DIASTOLIC BLOOD PRESSURE: 98 MMHG | HEART RATE: 95 BPM | RESPIRATION RATE: 33 BRPM | SYSTOLIC BLOOD PRESSURE: 122 MMHG

## 2023-10-24 VITALS
RESPIRATION RATE: 20 BRPM | SYSTOLIC BLOOD PRESSURE: 122 MMHG | OXYGEN SATURATION: 99 % | HEART RATE: 90 BPM | DIASTOLIC BLOOD PRESSURE: 80 MMHG

## 2023-10-24 VITALS
DIASTOLIC BLOOD PRESSURE: 101 MMHG | HEART RATE: 101 BPM | SYSTOLIC BLOOD PRESSURE: 122 MMHG | OXYGEN SATURATION: 97 % | RESPIRATION RATE: 24 BRPM

## 2023-10-24 VITALS
DIASTOLIC BLOOD PRESSURE: 93 MMHG | RESPIRATION RATE: 20 BRPM | HEART RATE: 98 BPM | OXYGEN SATURATION: 100 % | SYSTOLIC BLOOD PRESSURE: 122 MMHG

## 2023-10-24 VITALS
DIASTOLIC BLOOD PRESSURE: 80 MMHG | SYSTOLIC BLOOD PRESSURE: 126 MMHG | HEART RATE: 102 BPM | OXYGEN SATURATION: 100 % | RESPIRATION RATE: 23 BRPM

## 2023-10-24 VITALS
SYSTOLIC BLOOD PRESSURE: 122 MMHG | DIASTOLIC BLOOD PRESSURE: 80 MMHG | HEART RATE: 96 BPM | RESPIRATION RATE: 30 BRPM | OXYGEN SATURATION: 97 %

## 2023-10-24 VITALS
SYSTOLIC BLOOD PRESSURE: 122 MMHG | DIASTOLIC BLOOD PRESSURE: 94 MMHG | RESPIRATION RATE: 20 BRPM | HEART RATE: 78 BPM | OXYGEN SATURATION: 99 %

## 2023-10-24 VITALS
TEMPERATURE: 98.24 F | DIASTOLIC BLOOD PRESSURE: 78 MMHG | HEART RATE: 88 BPM | SYSTOLIC BLOOD PRESSURE: 122 MMHG | RESPIRATION RATE: 20 BRPM | OXYGEN SATURATION: 96 %

## 2023-10-24 VITALS
OXYGEN SATURATION: 96 % | TEMPERATURE: 97.2 F | RESPIRATION RATE: 32 BRPM | HEART RATE: 101 BPM | SYSTOLIC BLOOD PRESSURE: 122 MMHG | DIASTOLIC BLOOD PRESSURE: 105 MMHG

## 2023-10-24 VITALS
DIASTOLIC BLOOD PRESSURE: 71 MMHG | TEMPERATURE: 98.42 F | OXYGEN SATURATION: 97 % | HEART RATE: 100 BPM | RESPIRATION RATE: 16 BRPM | SYSTOLIC BLOOD PRESSURE: 122 MMHG

## 2023-10-24 VITALS
TEMPERATURE: 98.42 F | RESPIRATION RATE: 18 BRPM | OXYGEN SATURATION: 98 % | DIASTOLIC BLOOD PRESSURE: 80 MMHG | SYSTOLIC BLOOD PRESSURE: 122 MMHG | HEART RATE: 76 BPM

## 2023-10-24 VITALS — BODY MASS INDEX: 40.8 KG/M2

## 2023-10-24 DIAGNOSIS — N88.8: Primary | ICD-10-CM

## 2023-10-24 DIAGNOSIS — N87.9: ICD-10-CM

## 2023-10-24 DIAGNOSIS — E06.3: ICD-10-CM

## 2023-10-24 DIAGNOSIS — Z79.899: ICD-10-CM

## 2023-10-24 DIAGNOSIS — E66.9: ICD-10-CM

## 2023-10-24 DIAGNOSIS — N92.0: ICD-10-CM

## 2023-10-24 DIAGNOSIS — N80.03: ICD-10-CM

## 2023-10-24 DIAGNOSIS — I10: ICD-10-CM

## 2023-10-24 LAB
INTERNAL QC VALIDATED?: (no result)
RECORD KIT LOT#,URINE PREG: (no result)

## 2023-10-24 PROCEDURE — 86901 BLOOD TYPING SEROLOGIC RH(D): CPT

## 2023-10-24 PROCEDURE — 81025 URINE PREGNANCY TEST: CPT

## 2023-10-24 PROCEDURE — 00840 ANES IPER PX LOWER ABD NOS: CPT

## 2023-10-24 PROCEDURE — 36415 COLL VENOUS BLD VENIPUNCTURE: CPT

## 2023-10-24 PROCEDURE — 86850 RBC ANTIBODY SCREEN: CPT

## 2023-10-24 PROCEDURE — 88305 TISSUE EXAM BY PATHOLOGIST: CPT

## 2023-10-24 PROCEDURE — 86900 BLOOD TYPING SEROLOGIC ABO: CPT

## 2023-10-24 PROCEDURE — 58570 TLH UTERUS 250 G OR LESS: CPT

## 2023-10-24 PROCEDURE — 85027 COMPLETE CBC AUTOMATED: CPT

## 2023-10-24 PROCEDURE — 83735 ASSAY OF MAGNESIUM: CPT

## 2023-10-24 PROCEDURE — 0UT94ZZ RESECTION OF UTERUS, PERCUTANEOUS ENDOSCOPIC APPROACH: ICD-10-PCS | Performed by: OBSTETRICS & GYNECOLOGY

## 2023-10-24 RX ADMIN — CEFAZOLIN 150 GM: 10 INJECTION, POWDER, FOR SOLUTION INTRAVENOUS at 14:20

## 2023-10-24 RX ADMIN — OXYCODONE AND ACETAMINOPHEN 0 TABLET: 5; 325 TABLET ORAL at 18:58

## 2023-10-24 RX ADMIN — MAGNESIUM SULFATE HEPTAHYDRATE 408 GM: 500 INJECTION, SOLUTION INTRAMUSCULAR; INTRAVENOUS at 12:50

## 2023-11-07 ENCOUNTER — HOSPITAL ENCOUNTER (OUTPATIENT)
Dept: HOSPITAL 100 - LAB.FUTURE | Age: 43
Discharge: HOME | End: 2023-11-07
Payer: COMMERCIAL

## 2023-11-07 DIAGNOSIS — E03.9: ICD-10-CM

## 2023-11-07 DIAGNOSIS — Z86.39: ICD-10-CM

## 2023-11-07 DIAGNOSIS — Z00.00: Primary | ICD-10-CM

## 2023-11-07 DIAGNOSIS — E55.9: ICD-10-CM

## 2023-11-07 LAB
ALANINE AMINOTRANSFER ALT/SGPT: 19 U/L (ref 13–56)
ALBUMIN SERPL-MCNC: 3.8 G/DL (ref 3.2–5)
ALKALINE PHOSPHATASE: 109 U/L (ref 45–117)
ANION GAP: 3 (ref 5–15)
AST(SGOT): 12 U/L (ref 15–37)
BUN SERPL-MCNC: 9 MG/DL (ref 7–18)
BUN/CREAT RATIO: 13.2 RATIO (ref 10–20)
CALCIUM SERPL-MCNC: 8.9 MG/DL (ref 8.5–10.1)
CARBON DIOXIDE: 28 MMOL/L (ref 21–32)
CHLORIDE: 105 MMOL/L (ref 98–107)
CHOLEST SERPL-MCNC: 189 MG/DL
DEPRECATED RDW RBC: 45.8 FL (ref 35.1–43.9)
ERYTHROCYTE [DISTWIDTH] IN BLOOD: 13.4 % (ref 11.6–14.6)
EST GLOM FILT RATE - AFR AMER: 122 ML/MIN (ref 60–?)
FERRITIN SERPL-MCNC: 127 NG/ML (ref 8–252)
FREE T3: 2.5 PG/ML (ref 2.18–3.98)
GLOBULIN: 4 G/DL (ref 2.2–4.2)
GLUCOSE: 94 MG/DL (ref 74–106)
HCT VFR BLD AUTO: 44.8 % (ref 37–47)
HEMOGLOBIN: 14.7 G/DL (ref 12–15)
HGB BLD-MCNC: 14.7 G/DL (ref 12–15)
IMMATURE GRANULOCYTES COUNT: 0.07 X10^3/UL (ref 0–0)
IRON BINDING CAPACITY,TOTAL: 365 UG/DL (ref 250–450)
IRON SATN MFR SERPL: 33.4 % (ref 15–55)
IRON SPEC-MCNT: 122 UG/DL (ref 50–170)
MCV RBC: 91.6 FL (ref 81–99)
MEAN CORP HGB CONC: 32.8 G/DL (ref 32–36)
MEAN PLATELET VOL.: 8.2 FL (ref 6.2–12)
NRBC FLAGGED BY ANALYZER: 0 % (ref 0–5)
PLATELET # BLD: 451 K/MM3 (ref 150–450)
PLATELET COUNT: 451 K/MM3 (ref 150–450)
POTASSIUM: 3.7 MMOL/L (ref 3.5–5.1)
PTHIN: 121.4 PG/ML (ref 18.4–80.1)
RBC # BLD AUTO: 4.89 M/MM3 (ref 4.2–5.4)
RBC DISTRIBUTION WIDTH CV: 13.4 % (ref 11.6–14.6)
RBC DISTRIBUTION WIDTH SD: 45.8 FL (ref 35.1–43.9)
TRIGLYCERIDES: 129 MG/DL
VITAMIN D,25 HYDROXY: 31.4 NG/ML
VLDLC SERPL-MCNC: 26 MG/DL (ref 5–40)
WBC # BLD AUTO: 12.2 K/MM3 (ref 4.4–11)
WHITE BLOOD COUNT: 12.2 K/MM3 (ref 4.4–11)

## 2023-11-07 PROCEDURE — 83970 ASSAY OF PARATHORMONE: CPT

## 2023-11-07 PROCEDURE — 80061 LIPID PANEL: CPT

## 2023-11-07 PROCEDURE — 82728 ASSAY OF FERRITIN: CPT

## 2023-11-07 PROCEDURE — 83540 ASSAY OF IRON: CPT

## 2023-11-07 PROCEDURE — 84481 FREE ASSAY (FT-3): CPT

## 2023-11-07 PROCEDURE — 82306 VITAMIN D 25 HYDROXY: CPT

## 2023-11-07 PROCEDURE — 84439 ASSAY OF FREE THYROXINE: CPT

## 2023-11-07 PROCEDURE — 36415 COLL VENOUS BLD VENIPUNCTURE: CPT

## 2023-11-07 PROCEDURE — 83550 IRON BINDING TEST: CPT

## 2023-11-07 PROCEDURE — 84443 ASSAY THYROID STIM HORMONE: CPT

## 2023-11-07 PROCEDURE — 80053 COMPREHEN METABOLIC PANEL: CPT

## 2023-11-07 PROCEDURE — 84482 T3 REVERSE: CPT

## 2023-11-07 PROCEDURE — 85025 COMPLETE CBC W/AUTO DIFF WBC: CPT

## 2023-12-12 ENCOUNTER — APPOINTMENT (OUTPATIENT)
Dept: PRIMARY CARE | Facility: CLINIC | Age: 43
End: 2023-12-12
Payer: MEDICARE

## 2024-02-26 ENCOUNTER — HOSPITAL ENCOUNTER (OUTPATIENT)
Age: 44
Discharge: HOME | End: 2024-02-26
Payer: COMMERCIAL

## 2024-02-26 DIAGNOSIS — E21.3: Primary | ICD-10-CM

## 2024-02-26 LAB
DEPRECATED RDW RBC: 42.6 FL (ref 35.1–43.9)
ERYTHROCYTE [DISTWIDTH] IN BLOOD: 12.9 % (ref 11.6–14.6)
HCT VFR BLD AUTO: 41 % (ref 37–47)
HGB BLD-MCNC: 13.2 G/DL (ref 12–15)
IMMATURE GRANULOCYTES COUNT: 0.03 X10^3/UL (ref 0–0)
MCV RBC: 90.3 FL (ref 81–99)
MEAN CORP HGB CONC: 32.2 G/DL (ref 32–36)
MEAN PLATELET VOL.: 8.7 FL (ref 6.2–12)
NRBC FLAGGED BY ANALYZER: 0 % (ref 0–5)
PLATELET # BLD: 423 K/MM3 (ref 150–450)
PTHIN: 62.2 PG/ML (ref 18.4–80.1)
RBC # BLD AUTO: 4.54 M/MM3 (ref 4.2–5.4)
VITAMIN D,25 HYDROXY: 33.5 NG/ML
WBC # BLD AUTO: 9.8 K/MM3 (ref 4.4–11)

## 2024-02-26 PROCEDURE — 85025 COMPLETE CBC W/AUTO DIFF WBC: CPT

## 2024-02-26 PROCEDURE — 83970 ASSAY OF PARATHORMONE: CPT

## 2024-02-26 PROCEDURE — 36415 COLL VENOUS BLD VENIPUNCTURE: CPT

## 2024-02-26 PROCEDURE — 82306 VITAMIN D 25 HYDROXY: CPT

## 2024-07-17 NOTE — PROGRESS NOTES
Subjective:  Ivy Diana is a 44 y.o. female who presents to clinic today for Establish Care      She's noticing that she has been having increasing diarrhea. She has to leave situations quickly due to symptoms. She has been diagnosed with IBS many years ago.    She also is noticing some heart racing.     Hx of hysterectomy due to heavy menses.     She is on nifedipine for hypertension. She had preeclampsia with her last pregnancy and hypertension never went away. She notes that her hypertension has not been well controlled. Diastolic numbers are too high.     Last mammogram was normal and October of last year.     Review of Systems    Assessment/Plan:  Ivy Diana is a 44 y.o. female with a history of IBS, hypertension, PCOS, Hashimoto's who presents to clinic today to address the following issues:   1. PCOS (polycystic ovarian syndrome)  Hemoglobin A1c    Insulin, Fasting      2. Hypertension, unspecified type  lisinopril 20 mg tablet    Basic metabolic panel      3. Palpitations  Holter or Event Cardiac Monitor      4. Irritable bowel syndrome with diarrhea        5. Hashimoto's disease            Problem List Items Addressed This Visit       Hashimoto's disease    Overview     on NP thyroid daily         Current Assessment & Plan     - Chronic problem, unresolved, new to this provider, requires further workup and treatment   - Discussed with pt that she can either continue to follow with endocrine or welcome to establish here         Irritable bowel syndrome    Current Assessment & Plan     - Chronic problem, unresolved, new to this provider, requires further workup and treatment   - Discussed with pt dietary changes including low FODMAP diet as well as monitoring for lifestyle and food triggers         PCOS (polycystic ovarian syndrome) - Primary    Current Assessment & Plan     Discussed with Ivy that I would recommend obtaining fasting insulin as well as hemoglobin A1c.         Relevant Orders     Hemoglobin A1c    Insulin, Fasting    Hypertension    Current Assessment & Plan     - Chronic problem, unresolved, new to this provider, requires further workup and treatment   - Discussed with pt that we would switch nifedipine to lisinopril 20 mg and have her take a blood pressure log  Check BMP in 1 month         Relevant Medications    lisinopril 20 mg tablet    Other Relevant Orders    Basic metabolic panel     Other Visit Diagnoses       Palpitations        Relevant Orders    Holter or Event Cardiac Monitor            Patient Instructions   Diarrhea  - START two weeks of food diary, write down what you are eating and the symptoms you are having - try to correlate certain foods with symptoms  - THEN start two weeks of NO GLUTEN  - THEN start two weeks of NO DAIRY  - THEN start two weeks of NO FODMAPS    HOME BLOOD PRESSURE LOG  Goal < 130/80  Instructions:      Measure your blood pressure twice a day--morning and evening--at about the same times every day.  For best results, sit comfortably with both feet on the floor for at least five minutes before taking a measurement.  When you measure your blood pressure, rest your arm on a table so the blood pressure cuff is at about the same height as your heart.  Record your blood pressure on this sheet and show it to your doctor at every visit.    Date Morning Evening             /        /             /        /             /        /             /        /             /        /             /        /             /        /             /        /             /        /             /        /             /        /             /        /             /        /             /        /     For any reading >150/100, sit and rest quietly for 15 minutes and then re-check. If still elevated, please call your provider or go to the emergency room to be evaluated.        Follow up: 3 months or sooner as needed    Return precautions discussed.  An After Visit Summary was given  "to the patient.  All questions were answered and patient in agreement with plan.    Objective:  /68   Pulse 102   Ht 1.575 m (5' 2\")   Wt 104 kg (229 lb 8 oz)   SpO2 98%   BMI 41.98 kg/m²   Physical Exam  Vitals and nursing note reviewed.   Constitutional:       General: She is not in acute distress.     Appearance: Normal appearance.   HENT:      Head: Normocephalic and atraumatic.      Mouth/Throat:      Mouth: Mucous membranes are moist.   Eyes:      General: No scleral icterus.        Right eye: No discharge.         Left eye: No discharge.      Extraocular Movements: Extraocular movements intact.      Conjunctiva/sclera: Conjunctivae normal.   Cardiovascular:      Rate and Rhythm: Normal rate and regular rhythm.   Pulmonary:      Effort: Pulmonary effort is normal. No respiratory distress.      Breath sounds: Normal breath sounds.   Abdominal:      General: Abdomen is flat. Bowel sounds are normal. There is no distension.      Palpations: Abdomen is soft. There is no mass.   Musculoskeletal:      Right lower leg: No edema.      Left lower leg: No edema.   Skin:     General: Skin is warm and dry.   Neurological:      General: No focal deficit present.      Mental Status: She is alert and oriented to person, place, and time.   Psychiatric:         Attention and Perception: Attention normal.         Mood and Affect: Mood normal.         Speech: Speech normal.         Behavior: Behavior normal.         Cognition and Memory: Cognition and memory normal.         Judgment: Judgment normal.           I spent 37 minutes in total time for this visit including all related clinical activities before, during, and after the visit excluding other billable activities/procedure time.     Kaylen Devries MD    "

## 2024-07-18 ENCOUNTER — APPOINTMENT (OUTPATIENT)
Dept: PRIMARY CARE | Facility: CLINIC | Age: 44
End: 2024-07-18
Payer: MEDICARE

## 2024-07-18 ENCOUNTER — TELEPHONE (OUTPATIENT)
Dept: PRIMARY CARE | Facility: CLINIC | Age: 44
End: 2024-07-18

## 2024-07-18 VITALS
HEIGHT: 62 IN | SYSTOLIC BLOOD PRESSURE: 114 MMHG | WEIGHT: 229.5 LBS | HEART RATE: 102 BPM | BODY MASS INDEX: 42.23 KG/M2 | OXYGEN SATURATION: 98 % | DIASTOLIC BLOOD PRESSURE: 68 MMHG

## 2024-07-18 DIAGNOSIS — E28.2 PCOS (POLYCYSTIC OVARIAN SYNDROME): Primary | ICD-10-CM

## 2024-07-18 DIAGNOSIS — I10 HYPERTENSION, UNSPECIFIED TYPE: ICD-10-CM

## 2024-07-18 DIAGNOSIS — R00.2 PALPITATIONS: ICD-10-CM

## 2024-07-18 DIAGNOSIS — E06.3 HASHIMOTO'S DISEASE: ICD-10-CM

## 2024-07-18 DIAGNOSIS — K58.0 IRRITABLE BOWEL SYNDROME WITH DIARRHEA: ICD-10-CM

## 2024-07-18 PROBLEM — F32.9 MAJOR DEPRESSIVE DISORDER, SINGLE EPISODE: Status: ACTIVE | Noted: 2022-03-09

## 2024-07-18 PROBLEM — N97.9 INFERTILITY, FEMALE: Status: ACTIVE | Noted: 2022-03-09

## 2024-07-18 PROCEDURE — 99204 OFFICE O/P NEW MOD 45 MIN: CPT | Performed by: STUDENT IN AN ORGANIZED HEALTH CARE EDUCATION/TRAINING PROGRAM

## 2024-07-18 PROCEDURE — 3074F SYST BP LT 130 MM HG: CPT | Performed by: STUDENT IN AN ORGANIZED HEALTH CARE EDUCATION/TRAINING PROGRAM

## 2024-07-18 PROCEDURE — 1036F TOBACCO NON-USER: CPT | Performed by: STUDENT IN AN ORGANIZED HEALTH CARE EDUCATION/TRAINING PROGRAM

## 2024-07-18 PROCEDURE — 3008F BODY MASS INDEX DOCD: CPT | Performed by: STUDENT IN AN ORGANIZED HEALTH CARE EDUCATION/TRAINING PROGRAM

## 2024-07-18 PROCEDURE — 3078F DIAST BP <80 MM HG: CPT | Performed by: STUDENT IN AN ORGANIZED HEALTH CARE EDUCATION/TRAINING PROGRAM

## 2024-07-18 RX ORDER — NIFEDIPINE 30 MG/1
30 TABLET, FILM COATED, EXTENDED RELEASE ORAL
Qty: 90 TABLET | Refills: 3 | Status: CANCELLED | OUTPATIENT
Start: 2024-07-18 | End: 2025-07-18

## 2024-07-18 RX ORDER — LISINOPRIL 20 MG/1
20 TABLET ORAL DAILY
Qty: 30 TABLET | Refills: 3 | Status: SHIPPED | OUTPATIENT
Start: 2024-07-18 | End: 2025-08-22

## 2024-07-18 RX ORDER — LEVOTHYROXINE, LIOTHYRONINE 38; 9 UG/1; UG/1
1 TABLET ORAL
COMMUNITY
Start: 2024-05-21

## 2024-07-18 RX ORDER — NIFEDIPINE 30 MG/1
30 TABLET, EXTENDED RELEASE ORAL
COMMUNITY
Start: 2024-06-06 | End: 2024-07-18 | Stop reason: WASHOUT

## 2024-07-18 ASSESSMENT — PATIENT HEALTH QUESTIONNAIRE - PHQ9
1. LITTLE INTEREST OR PLEASURE IN DOING THINGS: NOT AT ALL
SUM OF ALL RESPONSES TO PHQ9 QUESTIONS 1 AND 2: 0
2. FEELING DOWN, DEPRESSED OR HOPELESS: NOT AT ALL

## 2024-07-18 NOTE — ASSESSMENT & PLAN NOTE
- Chronic problem, unresolved, new to this provider, requires further workup and treatment   - Discussed with pt that we would switch nifedipine to lisinopril 20 mg and have her take a blood pressure log  Check BMP in 1 month

## 2024-07-18 NOTE — PATIENT INSTRUCTIONS
Diarrhea  - START two weeks of food diary, write down what you are eating and the symptoms you are having - try to correlate certain foods with symptoms  - THEN start two weeks of NO GLUTEN  - THEN start two weeks of NO DAIRY  - THEN start two weeks of NO FODMAPS    HOME BLOOD PRESSURE LOG  Goal < 130/80  Instructions:      Measure your blood pressure twice a day--morning and evening--at about the same times every day.  For best results, sit comfortably with both feet on the floor for at least five minutes before taking a measurement.  When you measure your blood pressure, rest your arm on a table so the blood pressure cuff is at about the same height as your heart.  Record your blood pressure on this sheet and show it to your doctor at every visit.    Date Morning Evening             /        /             /        /             /        /             /        /             /        /             /        /             /        /             /        /             /        /             /        /             /        /             /        /             /        /             /        /     For any reading >150/100, sit and rest quietly for 15 minutes and then re-check. If still elevated, please call your provider or go to the emergency room to be evaluated.

## 2024-07-18 NOTE — ASSESSMENT & PLAN NOTE
- Chronic problem, unresolved, new to this provider, requires further workup and treatment   - Discussed with pt that she can either continue to follow with endocrine or welcome to establish here

## 2024-07-18 NOTE — TELEPHONE ENCOUNTER
Is Ivy calls please relay the following message.  I apologize for not asking her more questions about her tachycardia but I realized that after she left the office.  Although her heart did sound normal when I listened to it, with her faster heart rate in the office as well as a history of palpitations I would recommend that we order an event monitor to watch her heart for a week or so.  I have placed this order.    Kaylen Devries MD

## 2024-07-18 NOTE — ASSESSMENT & PLAN NOTE
- Chronic problem, unresolved, new to this provider, requires further workup and treatment   - Discussed with pt dietary changes including low FODMAP diet as well as monitoring for lifestyle and food triggers

## 2024-07-25 ENCOUNTER — HOSPITAL ENCOUNTER (OUTPATIENT)
Dept: CARDIOLOGY | Facility: HOSPITAL | Age: 44
Discharge: HOME | End: 2024-07-25
Payer: MEDICARE

## 2024-07-25 ENCOUNTER — APPOINTMENT (OUTPATIENT)
Dept: CARDIOLOGY | Facility: HOSPITAL | Age: 44
End: 2024-07-25
Payer: MEDICARE

## 2024-07-25 DIAGNOSIS — R00.2 PALPITATIONS: ICD-10-CM

## 2024-07-25 PROCEDURE — 93242 EXT ECG>48HR<7D RECORDING: CPT

## 2024-08-13 DIAGNOSIS — R00.2 PALPITATIONS: Primary | ICD-10-CM

## 2024-08-13 DIAGNOSIS — I47.19 AVNRT (AV NODAL RE-ENTRY TACHYCARDIA) (CMS-HCC): ICD-10-CM

## 2024-09-03 PROBLEM — J30.2 SEASONAL ALLERGIES: Status: ACTIVE | Noted: 2024-09-03

## 2024-09-03 PROBLEM — O28.9 ABNORMAL FINDING ON ANTENATAL SCREENING OF MOTHER: Status: ACTIVE | Noted: 2024-09-03

## 2024-09-03 PROBLEM — O09.529 MULTIGRAVIDA OF ADVANCED MATERNAL AGE (HHS-HCC): Status: ACTIVE | Noted: 2024-09-03

## 2024-09-24 ENCOUNTER — APPOINTMENT (OUTPATIENT)
Dept: CARDIOLOGY | Facility: CLINIC | Age: 44
End: 2024-09-24
Payer: MEDICARE

## 2024-09-24 VITALS
SYSTOLIC BLOOD PRESSURE: 100 MMHG | WEIGHT: 227.4 LBS | HEART RATE: 75 BPM | HEIGHT: 62 IN | OXYGEN SATURATION: 97 % | DIASTOLIC BLOOD PRESSURE: 68 MMHG | BODY MASS INDEX: 41.85 KG/M2

## 2024-09-24 DIAGNOSIS — I10 HYPERTENSION: Primary | ICD-10-CM

## 2024-09-24 DIAGNOSIS — I47.10 PSVT (PAROXYSMAL SUPRAVENTRICULAR TACHYCARDIA) (CMS-HCC): ICD-10-CM

## 2024-09-24 DIAGNOSIS — R00.2 PALPITATIONS: ICD-10-CM

## 2024-09-24 DIAGNOSIS — I47.19 AVNRT (AV NODAL RE-ENTRY TACHYCARDIA) (CMS-HCC): ICD-10-CM

## 2024-09-24 PROCEDURE — 99204 OFFICE O/P NEW MOD 45 MIN: CPT | Performed by: STUDENT IN AN ORGANIZED HEALTH CARE EDUCATION/TRAINING PROGRAM

## 2024-09-24 PROCEDURE — 3078F DIAST BP <80 MM HG: CPT | Performed by: STUDENT IN AN ORGANIZED HEALTH CARE EDUCATION/TRAINING PROGRAM

## 2024-09-24 PROCEDURE — 3008F BODY MASS INDEX DOCD: CPT | Performed by: STUDENT IN AN ORGANIZED HEALTH CARE EDUCATION/TRAINING PROGRAM

## 2024-09-24 PROCEDURE — 93000 ELECTROCARDIOGRAM COMPLETE: CPT | Performed by: STUDENT IN AN ORGANIZED HEALTH CARE EDUCATION/TRAINING PROGRAM

## 2024-09-24 PROCEDURE — 3074F SYST BP LT 130 MM HG: CPT | Performed by: STUDENT IN AN ORGANIZED HEALTH CARE EDUCATION/TRAINING PROGRAM

## 2024-09-24 PROCEDURE — 1036F TOBACCO NON-USER: CPT | Performed by: STUDENT IN AN ORGANIZED HEALTH CARE EDUCATION/TRAINING PROGRAM

## 2024-09-24 RX ORDER — METOPROLOL TARTRATE 25 MG/1
12.5 TABLET, FILM COATED ORAL DAILY
Qty: 15 TABLET | Refills: 5 | Status: SHIPPED | OUTPATIENT
Start: 2024-09-24 | End: 2025-03-23

## 2024-09-24 RX ORDER — CETIRIZINE HYDROCHLORIDE 10 MG/1
10 TABLET ORAL DAILY
COMMUNITY

## 2024-09-24 NOTE — PROGRESS NOTES
"    Cardiac Electrophysiology Office Visit     Referred by Kaylen Case MD for   Chief Complaint   Patient presents with    Palpitations     HPI:  Ivy Diana is a 44 y.o. year old female patient with h/o HTN, Hypothyroidism, Palpitations presenting today to establish care    Objective  Current Outpatient Medications   Medication Instructions    acetylcysteine (NAC ORAL) 600 mg, oral, Daily    cetirizine (ZYRTEC) 10 mg, oral, Daily    lisinopril 20 mg, oral, Daily    NP Thyroid 60 mg tablet 1 tablet, oral, Daily (0630)         Visit Vitals  /68   Pulse 75   Ht 1.575 m (5' 2\")   Wt 103 kg (227 lb 6.4 oz)   SpO2 97%   BMI 41.59 kg/m²   OB Status Hysterectomy   Smoking Status Never   BSA 2.12 m²      Physical Exam  Vitals reviewed.   Constitutional:       Appearance: Normal appearance.   HENT:      Head: Normocephalic.   Cardiovascular:      Rate and Rhythm: Normal rate and regular rhythm.   Pulmonary:      Effort: Pulmonary effort is normal. No respiratory distress.      Breath sounds: No wheezing.   Skin:     General: Skin is warm and dry.      Capillary Refill: Capillary refill takes less than 2 seconds.   Neurological:      Mental Status: She is alert.   Psychiatric:         Mood and Affect: Mood normal.           My Interpretation of Reviewed Study(s):  7-day cardiac monitor (July 2024): Predominant rhythm sinus with an average heart rate 82 bpm.  2 episodes of PSVT both symptomatic longest episode lasting 17 beats appears to be made to short RP tachycardia likely AVNRT versus atrial tachycardia    Assessment/Plan   #Palpitations  #PSVT  Occurring over the last few years, more noticeable recently. Had 3 day monitor 4 years ago was unremarkable. Episodes occurring <1/ week, episodes lasting <1 min. No LOC, CP, Dizziness.   Tobacco Denies, Alcohol Denies, Caffeine use   1-2 cups of coffee /day, Drug use  Denies  FamHx: No CAD, no SCD.   Works as . No exercise routine.    Since patient's episodes " are infrequent and fairly brief we will consider pill in the pocket beta-blocker.  If however she notices an increase in frequency or duration of the episodes then we could certainly consider either daily medications or then consider EP study/SVT ablation  Echo-to assess for any structural abnormalities  Metoprolol 12.5 mg as needed for episodes lasting more than 5 to 10 minutes with symptoms    # HTN  Controlled  Lisinopril 20 mg daily    Return to Clinic: Patient should return to the EP Clinic in 4 months    Remigio Garduno MD [facility]  Cardiac Electrophysiology  [email or web address]    **Disclaimer: This note was dictated by speech recognition, and every effort has been made to prevent any error in transcription, however minor errors may be present**

## 2024-09-27 ENCOUNTER — LAB (OUTPATIENT)
Dept: LAB | Facility: LAB | Age: 44
End: 2024-09-27
Payer: MEDICARE

## 2024-09-27 DIAGNOSIS — I10 HYPERTENSION, UNSPECIFIED TYPE: ICD-10-CM

## 2024-09-27 DIAGNOSIS — E28.2 PCOS (POLYCYSTIC OVARIAN SYNDROME): ICD-10-CM

## 2024-09-27 LAB
ANION GAP SERPL CALC-SCNC: 11 MMOL/L (ref 10–20)
BUN SERPL-MCNC: 10 MG/DL (ref 6–23)
CALCIUM SERPL-MCNC: 9.2 MG/DL (ref 8.6–10.3)
CHLORIDE SERPL-SCNC: 103 MMOL/L (ref 98–107)
CO2 SERPL-SCNC: 26 MMOL/L (ref 21–32)
CREAT SERPL-MCNC: 0.63 MG/DL (ref 0.5–1.05)
EGFRCR SERPLBLD CKD-EPI 2021: >90 ML/MIN/1.73M*2
GLUCOSE SERPL-MCNC: 78 MG/DL (ref 74–99)
POTASSIUM SERPL-SCNC: 4.1 MMOL/L (ref 3.5–5.3)
SODIUM SERPL-SCNC: 136 MMOL/L (ref 136–145)

## 2024-09-27 PROCEDURE — 83525 ASSAY OF INSULIN: CPT

## 2024-09-27 PROCEDURE — 36415 COLL VENOUS BLD VENIPUNCTURE: CPT

## 2024-09-27 PROCEDURE — 83036 HEMOGLOBIN GLYCOSYLATED A1C: CPT

## 2024-09-27 PROCEDURE — 80048 BASIC METABOLIC PNL TOTAL CA: CPT

## 2024-09-28 LAB
EST. AVERAGE GLUCOSE BLD GHB EST-MCNC: 108 MG/DL
HBA1C MFR BLD: 5.4 %
INSULIN P FAST SERPL-ACNC: 15 UIU/ML (ref 3–25)

## 2024-10-22 ENCOUNTER — APPOINTMENT (OUTPATIENT)
Dept: CARDIOLOGY | Facility: HOSPITAL | Age: 44
End: 2024-10-22
Payer: MEDICARE

## 2024-10-23 ENCOUNTER — TELEPHONE (OUTPATIENT)
Dept: CARDIOLOGY | Facility: CLINIC | Age: 44
End: 2024-10-23
Payer: MEDICARE

## 2024-10-23 NOTE — TELEPHONE ENCOUNTER
L/M FOR PT TO CALL BACK. I SPOKE TO DR MALAVE REGARDING DENIAL OF ECHO. WE CAN REVISIT SYMPTOMS AT NEXT APPT AND IF DEEMED NECESSARY WE CAN TRY AGAIN. OF COURSE IF SYMPTOMS GET WORSE, PLEASE GO TO ER.

## 2024-10-24 ENCOUNTER — APPOINTMENT (OUTPATIENT)
Age: 44
End: 2024-10-24
Payer: MEDICARE

## 2024-10-24 VITALS
WEIGHT: 228 LBS | OXYGEN SATURATION: 98 % | HEART RATE: 72 BPM | SYSTOLIC BLOOD PRESSURE: 112 MMHG | HEIGHT: 62 IN | BODY MASS INDEX: 41.96 KG/M2 | DIASTOLIC BLOOD PRESSURE: 80 MMHG

## 2024-10-24 DIAGNOSIS — G47.10 HYPERSOMNIA: Primary | ICD-10-CM

## 2024-10-24 DIAGNOSIS — K58.0 IRRITABLE BOWEL SYNDROME WITH DIARRHEA: ICD-10-CM

## 2024-10-24 PROBLEM — O28.9 ABNORMAL FINDING ON ANTENATAL SCREENING OF MOTHER: Status: RESOLVED | Noted: 2024-09-03 | Resolved: 2024-10-24

## 2024-10-24 PROBLEM — O09.529 MULTIGRAVIDA OF ADVANCED MATERNAL AGE (HHS-HCC): Status: RESOLVED | Noted: 2024-09-03 | Resolved: 2024-10-24

## 2024-10-24 PROCEDURE — 1036F TOBACCO NON-USER: CPT | Performed by: STUDENT IN AN ORGANIZED HEALTH CARE EDUCATION/TRAINING PROGRAM

## 2024-10-24 PROCEDURE — 3074F SYST BP LT 130 MM HG: CPT | Performed by: STUDENT IN AN ORGANIZED HEALTH CARE EDUCATION/TRAINING PROGRAM

## 2024-10-24 PROCEDURE — 99214 OFFICE O/P EST MOD 30 MIN: CPT | Performed by: STUDENT IN AN ORGANIZED HEALTH CARE EDUCATION/TRAINING PROGRAM

## 2024-10-24 PROCEDURE — 3079F DIAST BP 80-89 MM HG: CPT | Performed by: STUDENT IN AN ORGANIZED HEALTH CARE EDUCATION/TRAINING PROGRAM

## 2024-10-24 PROCEDURE — 3008F BODY MASS INDEX DOCD: CPT | Performed by: STUDENT IN AN ORGANIZED HEALTH CARE EDUCATION/TRAINING PROGRAM

## 2024-10-24 ASSESSMENT — PATIENT HEALTH QUESTIONNAIRE - PHQ9
2. FEELING DOWN, DEPRESSED OR HOPELESS: NOT AT ALL
SUM OF ALL RESPONSES TO PHQ9 QUESTIONS 1 & 2: 0
1. LITTLE INTEREST OR PLEASURE IN DOING THINGS: NOT AT ALL

## 2024-10-24 NOTE — PROGRESS NOTES
Clinic Follow Up:  [facility] Follow Up Note    Reason for Follow Up:  The primary encounter diagnosis was Adenomatous polyp of colon, unspecified part of colon. A diagnosis of Chronic constipation was also pertinent to this visit.    PCP: Gris Maier       HPI:  This is a 61 y.o. female here for follow up of the above  Pt previously seen by Dr. Castillo.  Today is my first visit with her.   Interpretation provided by CRISTIANO throughout entire visit.  Pt reports continued issues with mild constipation.   Has some abdominal discomfort that improved with BM.   Constipation is worse diet dependant.  Has avoided red meat and rice since that has worsened the symptoms.   Take PRN OTC medication to improve the constipation  Last colonoscopy 2020 with multiple polyps, both tubular adenoma and hyperplastic on pathology.   Denies any abdominal pain.  No nausea or vomiting.  No change in bowel pattern.  No melena or hematochezia. No weight loss.      Review of Systems   Constitutional:  Negative for chills, fever, malaise/fatigue and weight loss.   Respiratory:  Negative for cough.    Cardiovascular:  Negative for chest pain.   Gastrointestinal:         Per HPI   Musculoskeletal:  Negative for myalgias.   Skin:  Negative for itching and rash.   Neurological:  Negative for headaches.   Psychiatric/Behavioral:  The patient is not nervous/anxious.      Medical History:  Past Medical History:   Diagnosis Date    Allergy     Coronary artery disease involving native coronary artery of native heart without angina pectoris 7/23/2022    Diabetes mellitus     Diabetes mellitus, type 2     Hyperlipidemia     ST elevation myocardial infarction involving right coronary artery 7/22/2022       Surgical History:   Past Surgical History:   Procedure Laterality Date    COLONOSCOPY N/A 02/13/2020    Procedure: COLONOSCOPY;  Surgeon: Jed Castillo III, MD;  Location: [facility];  Service: Endoscopy;  Laterality:  Subjective:  Ivy Diana is a 44 y.o. female who presents to clinic today for Follow-up (3 MO FU LABS)      She notes that she has continued to have consistent and bad diarrhea. She ntoes that it is tied to stress mostly. Its worst when he son has a bad day. It also can be related to dairy.   She notes that over the past several months she's had diarrhea nearly every day. Only 5 days without and 10 days of severe diarreha that makes her think about where the nearest bathroom is.    She's upped protein intake and has cut out needless carbs.     She notes that she has a return of hoarseness. She notes that this has been back since last appt.     She notes that she has been very tired and that she snores a lot when she is ill. She notes that she falls asleep easily during the day and often cannot make it to bedtime.     Review of Systems    Assessment/Plan:  Ivy Diana is a 44 y.o. female with a history of hypertension, PSVT, allergies, Hashimoto's, PCOS who presents to clinic today to address the following issues:   1. Hypersomnia  Home sleep apnea test (HSAT)      2. Irritable bowel syndrome with diarrhea  Referral to Gastroenterology        I had a long discussion with Ivy regarding her hypersomnia snoring large tongue and other symptoms and we will obtain a home sleep study for her.    She additionally is having consistent IBS D symptoms with recurrent diarrhea.  She has been diagnosed with this in the past as a workup previously but it has been many many years so we will refer to GI for further evaluation and management.  Okay to use loperamide as needed for important events.  Problem List Items Addressed This Visit       Irritable bowel syndrome    Relevant Orders    Referral to Gastroenterology     Other Visit Diagnoses       Hypersomnia    -  Primary    Relevant Orders    Home sleep apnea test (HSAT)            There are no Patient Instructions on file for this visit.    Follow up: 3 months or  "sooner as needed    Return precautions discussed.  An After Visit Summary was given to the patient.  All questions were answered and patient in agreement with plan.    Objective:  /80   Pulse 72   Ht 1.575 m (5' 2\")   Wt 103 kg (228 lb)   SpO2 98%   BMI 41.70 kg/m²     Physical Exam  Vitals and nursing note reviewed.   Constitutional:       General: She is not in acute distress.     Appearance: Normal appearance.   HENT:      Head: Normocephalic and atraumatic.      Nose: Congestion present.      Mouth/Throat:      Mouth: Mucous membranes are moist.      Comments: Large tongue  Eyes:      General: No scleral icterus.        Right eye: No discharge.         Left eye: No discharge.      Extraocular Movements: Extraocular movements intact.      Conjunctiva/sclera: Conjunctivae normal.   Cardiovascular:      Rate and Rhythm: Normal rate and regular rhythm.   Pulmonary:      Effort: No respiratory distress.   Skin:     General: Skin is warm and dry.   Neurological:      General: No focal deficit present.      Mental Status: She is alert and oriented to person, place, and time.   Psychiatric:         Attention and Perception: Attention normal.         Mood and Affect: Mood normal.         Speech: Speech normal.         Behavior: Behavior normal.         Cognition and Memory: Cognition and memory normal.         Judgment: Judgment normal.         I spent 22 minutes in total time for this visit including all related clinical activities before, during, and after the visit excluding other billable activities/procedure time.     Kaylen Devries MD    " N/A;    LEFT HEART CATHETERIZATION Left 2022    Procedure: CATHETERIZATION, HEART, LEFT;  Surgeon: Jo Maldonado MD;  Location: [facility] CATH LAB;  Service: Cardiology;  Laterality: Left;       Family History:   Family History   Problem Relation Age of Onset    Diabetes Mother     Glaucoma Mother     Blindness Mother     Coronary artery disease Mother 63    Cancer Father         Lung    Breast cancer Neg Hx     Ovarian cancer Neg Hx     Acne Neg Hx     Eczema Neg Hx     Lupus Neg Hx     Psoriasis Neg Hx        Social History:   Social History     Tobacco Use    Smoking status: Former     Packs/day: 0.25     Types: Cigarettes     Quit date: 2022     Years since quittin.7    Smokeless tobacco: Never   Substance Use Topics    Alcohol use: No    Drug use: No       Allergies: Reviewed    Home Medications:  Current Outpatient Medications on File Prior to Visit   Medication Sig Dispense Refill    aspirin (ECOTRIN) 81 MG EC tablet TAKE 1 TABLET BY MOUTH DAILY 30 tablet 10    atorvastatin (LIPITOR) 80 MG tablet Take 1 tablet (80 mg total) by mouth once daily. 30 tablet 11    blood sugar diagnostic Strp 1 strip by Misc.(Non-Drug; Combo Route) route 2 (two) times a day. True Metrix Dx Code: E 11.9 200 strip 3    blood-glucose meter kit To check BG 2  times daily, to use with insurance preferred meter dx code: e11.69 1 each 0    dulaglutide (TRULICITY) 0.75 mg/0.5 mL pen injector Inject 0.75 mg into the skin every 7 days. 4 pen 2    fenofibrate (TRICOR) 145 MG tablet Take 1 tablet (145 mg total) by mouth once daily. 90 tablet 3    fluticasone propionate (FLONASE) 50 mcg/actuation nasal spray SHAKE LIQUID AND USE 2 SPRAYS(100 MCG) IN EACH NOSTRIL EVERY DAY 48 g 2    gabapentin (NEURONTIN) 300 MG capsule Take 1 capsule (300 mg total) by mouth 3 (three) times daily. 90 capsule 11    glipiZIDE (GLUCOTROL) 10 MG TR24 TAKE 1 TABLET BY MOUTH EVERY DAY WITH BREAKFAST 90 tablet 1    lancets Misc 1 lancet by Misc.(Non-Drug;  "Combo Route) route 2 (two) times a day. 200 each 3    loratadine (CLARITIN) 10 mg tablet TAKE 1 TABLET BY MOUTH EVERY DAY 30 tablet 10    metFORMIN (GLUCOPHAGE) 1000 MG tablet Take 1 tablet (1,000 mg total) by mouth 2 (two) times a day. 180 tablet 4    metoprolol succinate (TOPROL-XL) 50 MG 24 hr tablet Take 1 tablet (50 mg total) by mouth once daily. 30 tablet 11    omega-3s-dha-epa-fish oil 120 mg-180 mg- 60 mg-1,200 mg CpDR Take 1 tablet by mouth once daily.      omeprazole (PRILOSEC) 20 MG capsule TAKE 1 CAPSULE BY MOUTH EVERY DAY 30 capsule 10    ranolazine (RANEXA) 500 MG Tb12 Take 1 tablet (500 mg total) by mouth 2 (two) times daily. 60 tablet 11    ticagrelor (BRILINTA) 90 mg tablet Take 1 tablet (90 mg total) by mouth 2 (two) times daily. 60 tablet 11    [DISCONTINUED] rosuvastatin (CRESTOR) 20 MG tablet Take 1 tablet (20 mg total) by mouth every evening. 90 tablet 3     No current facility-administered medications on file prior to visit.       Physical Exam:  Vital Signs:  /80 (BP Location: Left arm, Patient Position: Sitting, BP Method: Medium (Manual))   Pulse 81   Ht 5' 4" (1.626 m)   Wt 74 kg (163 lb 2.3 oz)   BMI 28.00 kg/m²   Body mass index is 28 kg/m².  Physical Exam  Vitals reviewed.   Constitutional:       Appearance: She is well-developed.   HENT:      Head: Normocephalic.   Eyes:      General: No scleral icterus.  Cardiovascular:      Rate and Rhythm: Normal rate.   Pulmonary:      Effort: Pulmonary effort is normal.   Abdominal:      General: There is no distension.   Musculoskeletal:         General: Normal range of motion.      Cervical back: Normal range of motion.   Skin:     General: Skin is dry.   Neurological:      Mental Status: She is alert and oriented to person, place, and time.       Labs: Pertinent labs reviewed.      Assessment:  1. Adenomatous polyp of colon, unspecified part of colon    2. Chronic constipation        Recommendations:  Continue PRN OTC medication to " control the constipation  - increase dietary fiber and water intake  - can take Miralax daily if needed.  If the constipation worsens, can plan for trial of Linzess or Amitiza.   - will plan for colonoscopy as recommended on last colonoscopy.   - PAT, Case request and prep orders placed     Return to Clinic:  As needed based on symptoms and results of above

## 2024-10-29 ENCOUNTER — CLINICAL SUPPORT (OUTPATIENT)
Dept: SLEEP MEDICINE | Facility: HOSPITAL | Age: 44
End: 2024-10-29
Payer: MEDICARE

## 2024-10-29 VITALS — HEIGHT: 62 IN | BODY MASS INDEX: 41.96 KG/M2 | WEIGHT: 228 LBS

## 2024-10-29 DIAGNOSIS — G47.10 HYPERSOMNIA: ICD-10-CM

## 2024-10-29 DIAGNOSIS — G47.33 OBSTRUCTIVE SLEEP APNEA (ADULT) (PEDIATRIC): ICD-10-CM

## 2024-10-29 PROCEDURE — 9420000001 HC RT PATIENT EDUCATION 5 MIN

## 2024-10-29 PROCEDURE — 95806 SLEEP STUDY UNATT&RESP EFFT: CPT | Performed by: STUDENT IN AN ORGANIZED HEALTH CARE EDUCATION/TRAINING PROGRAM

## 2024-10-31 ENCOUNTER — TELEPHONE (OUTPATIENT)
Age: 44
End: 2024-10-31
Payer: MEDICARE

## 2024-11-05 DIAGNOSIS — I10 HYPERTENSION, UNSPECIFIED TYPE: ICD-10-CM

## 2024-11-12 DIAGNOSIS — I10 HYPERTENSION, UNSPECIFIED TYPE: ICD-10-CM

## 2024-11-12 RX ORDER — LISINOPRIL 20 MG/1
20 TABLET ORAL DAILY
Qty: 30 TABLET | Refills: 1 | Status: SHIPPED | OUTPATIENT
Start: 2024-11-12 | End: 2025-11-12

## 2024-11-18 DIAGNOSIS — E06.3 HASHIMOTO'S DISEASE: Primary | ICD-10-CM

## 2024-11-18 RX ORDER — LEVOTHYROXINE, LIOTHYRONINE 38; 9 UG/1; UG/1
60 TABLET ORAL
Qty: 90 TABLET | Refills: 3 | Status: SHIPPED | OUTPATIENT
Start: 2024-11-18 | End: 2025-11-18

## 2024-11-20 RX ORDER — LISINOPRIL 20 MG/1
20 TABLET ORAL DAILY
Qty: 30 TABLET | Refills: 0 | OUTPATIENT
Start: 2024-11-20

## 2025-01-16 ENCOUNTER — APPOINTMENT (OUTPATIENT)
Dept: GASTROENTEROLOGY | Facility: CLINIC | Age: 45
End: 2025-01-16
Payer: MEDICARE

## 2025-01-16 VITALS
OXYGEN SATURATION: 98 % | DIASTOLIC BLOOD PRESSURE: 83 MMHG | RESPIRATION RATE: 16 BRPM | HEART RATE: 79 BPM | BODY MASS INDEX: 43.28 KG/M2 | HEIGHT: 62 IN | SYSTOLIC BLOOD PRESSURE: 134 MMHG | WEIGHT: 235.2 LBS

## 2025-01-16 DIAGNOSIS — K58.0 IRRITABLE BOWEL SYNDROME WITH DIARRHEA: ICD-10-CM

## 2025-01-16 PROCEDURE — 3079F DIAST BP 80-89 MM HG: CPT | Performed by: INTERNAL MEDICINE

## 2025-01-16 PROCEDURE — 1036F TOBACCO NON-USER: CPT | Performed by: INTERNAL MEDICINE

## 2025-01-16 PROCEDURE — 3008F BODY MASS INDEX DOCD: CPT | Performed by: INTERNAL MEDICINE

## 2025-01-16 PROCEDURE — 3075F SYST BP GE 130 - 139MM HG: CPT | Performed by: INTERNAL MEDICINE

## 2025-01-16 PROCEDURE — 99213 OFFICE O/P EST LOW 20 MIN: CPT | Performed by: INTERNAL MEDICINE

## 2025-01-16 ASSESSMENT — ENCOUNTER SYMPTOMS
ENDOCRINE NEGATIVE: 1
CONSTITUTIONAL NEGATIVE: 1
EYES NEGATIVE: 1
NAUSEA: 1
NEUROLOGICAL NEGATIVE: 1
RESPIRATORY NEGATIVE: 1
CARDIOVASCULAR NEGATIVE: 1
HEMATOLOGIC/LYMPHATIC NEGATIVE: 1
DIARRHEA: 1
ABDOMINAL PAIN: 1

## 2025-01-16 NOTE — PROGRESS NOTES
Subjective   Patient ID: Ivy Diana is a 44 y.o. female who presents for New Patient Visit (Pt presents today as a new patient with complaints of IBS. Pt has complaints of diarrhea, every time she has a movement, sometimes multiple times a day. Pt states she does have a movement every day. Pt states no visual blood, but stool is pretty dark. Pt states no vomiting, but she does have issues with nausea. Pt states that is getting worse. Pt states she hasn't found anything that makes it worse or better besides avoiding foods that she knows triggers, but here lately it hasn't mattered. ).    HPI 44-year-old female diagnosed with IBS through health clinic while studying at [employer].  Underwent upper GI imaging and barium enema.  Was at that time treated with diet Bentyl.  Has failed diet therapy did not find relief with Bentyl.  Has tried elimination diet in the past but has been unsuccessful.  Denies rectal bleeding averaging 6 bowel movements daily has periods of improvement if she just goes on liquid diet.  Denies any abdominal pain, weight loss or tenesmus.    Review of Systems   Constitutional: Negative.    HENT: Negative.     Eyes: Negative.    Respiratory: Negative.     Cardiovascular: Negative.    Gastrointestinal:  Positive for abdominal pain, diarrhea and nausea.   Endocrine: Negative.    Genitourinary: Negative.    Neurological: Negative.    Hematological: Negative.        Objective   Physical Exam  Vitals and nursing note reviewed.   Constitutional:       Appearance: Normal appearance.   HENT:      Head: Normocephalic.      Mouth/Throat:      Mouth: Mucous membranes are moist.      Pharynx: Oropharynx is clear.   Eyes:      Conjunctiva/sclera: Conjunctivae normal.      Pupils: Pupils are equal, round, and reactive to light.   Cardiovascular:      Rate and Rhythm: Normal rate and regular rhythm.      Heart sounds: Normal heart sounds.   Pulmonary:      Effort: Pulmonary effort is normal.       Breath sounds: Normal breath sounds.   Abdominal:      General: Abdomen is flat. Bowel sounds are normal.      Palpations: Abdomen is soft.   Musculoskeletal:         General: Normal range of motion.      Cervical back: Normal range of motion and neck supple.   Skin:     General: Skin is warm and dry.   Neurological:      General: No focal deficit present.      Mental Status: She is alert and oriented to person, place, and time.   Psychiatric:         Behavior: Behavior normal.         Assessment/Plan   Diagnoses and all orders for this visit:  Irritable bowel syndrome with diarrhea  -     Referral to Gastroenterology  -     rifAXIMin (Xifaxan) 550 mg tablet; Take 1 tablet (550 mg) by mouth 3 times a day for 14 days.       Longstanding IBS with diarrhea.  Begin Xifaxan begin FODMAP diet follow-up in 2 months    Claudio Huff DO 01/16/25 12:02 PM

## 2025-01-27 ENCOUNTER — APPOINTMENT (OUTPATIENT)
Age: 45
End: 2025-01-27
Payer: MEDICARE

## 2025-01-27 VITALS
HEART RATE: 77 BPM | SYSTOLIC BLOOD PRESSURE: 130 MMHG | HEIGHT: 62 IN | OXYGEN SATURATION: 98 % | DIASTOLIC BLOOD PRESSURE: 72 MMHG | BODY MASS INDEX: 42.69 KG/M2 | WEIGHT: 232 LBS

## 2025-01-27 DIAGNOSIS — Z23 NEED FOR INFLUENZA VACCINATION: ICD-10-CM

## 2025-01-27 DIAGNOSIS — E06.3 HASHIMOTO'S DISEASE: Primary | ICD-10-CM

## 2025-01-27 DIAGNOSIS — Z13.220 SCREENING, LIPID: ICD-10-CM

## 2025-01-27 DIAGNOSIS — I10 HYPERTENSION, UNSPECIFIED TYPE: ICD-10-CM

## 2025-01-27 DIAGNOSIS — Z12.31 BREAST CANCER SCREENING BY MAMMOGRAM: ICD-10-CM

## 2025-01-27 PROCEDURE — 3078F DIAST BP <80 MM HG: CPT | Performed by: STUDENT IN AN ORGANIZED HEALTH CARE EDUCATION/TRAINING PROGRAM

## 2025-01-27 PROCEDURE — 99214 OFFICE O/P EST MOD 30 MIN: CPT | Performed by: STUDENT IN AN ORGANIZED HEALTH CARE EDUCATION/TRAINING PROGRAM

## 2025-01-27 PROCEDURE — 1036F TOBACCO NON-USER: CPT | Performed by: STUDENT IN AN ORGANIZED HEALTH CARE EDUCATION/TRAINING PROGRAM

## 2025-01-27 PROCEDURE — 3075F SYST BP GE 130 - 139MM HG: CPT | Performed by: STUDENT IN AN ORGANIZED HEALTH CARE EDUCATION/TRAINING PROGRAM

## 2025-01-27 PROCEDURE — 90471 IMMUNIZATION ADMIN: CPT | Performed by: STUDENT IN AN ORGANIZED HEALTH CARE EDUCATION/TRAINING PROGRAM

## 2025-01-27 PROCEDURE — G2211 COMPLEX E/M VISIT ADD ON: HCPCS | Performed by: STUDENT IN AN ORGANIZED HEALTH CARE EDUCATION/TRAINING PROGRAM

## 2025-01-27 PROCEDURE — 90673 RIV3 VACCINE NO PRESERV IM: CPT | Performed by: STUDENT IN AN ORGANIZED HEALTH CARE EDUCATION/TRAINING PROGRAM

## 2025-01-27 PROCEDURE — 3008F BODY MASS INDEX DOCD: CPT | Performed by: STUDENT IN AN ORGANIZED HEALTH CARE EDUCATION/TRAINING PROGRAM

## 2025-01-27 RX ORDER — LISINOPRIL 20 MG/1
20 TABLET ORAL DAILY
Qty: 90 TABLET | Refills: 3 | Status: SHIPPED | OUTPATIENT
Start: 2025-01-27 | End: 2026-01-27

## 2025-01-27 ASSESSMENT — PATIENT HEALTH QUESTIONNAIRE - PHQ9
SUM OF ALL RESPONSES TO PHQ9 QUESTIONS 1 & 2: 0
2. FEELING DOWN, DEPRESSED OR HOPELESS: NOT AT ALL
1. LITTLE INTEREST OR PLEASURE IN DOING THINGS: NOT AT ALL

## 2025-01-27 NOTE — PROGRESS NOTES
Subjective:  Ivy Diana is a 44 y.o. female who presents to clinic today for Follow-up (3 MO FU )      She's seen GI and notes that things have been better on rifaximin and low FODMAP diet.   She notes improvement around thanksgiving.  [place] was worse, she was traveling and was in [place] for this time.    She does have follow-up with her OB/GYN for 1 year follow-up post hysterectomy.  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  Due to this she would like to receive her flu shot.    She does not have an endocrinologist anymore and would like to be managed in our office.    Review of Systems    Assessment/Plan:  Ivy Diana is a 44 y.o. female who presents to clinic today to address the following issues:   1. Hashimoto's disease  TSH    TSH      2. Hypertension, unspecified type  Comprehensive metabolic panel    Comprehensive metabolic panel      3. Breast cancer screening by mammogram  BI mammo bilateral screening tomosynthesis      4. Need for influenza vaccination  Flu vaccine, trivalent, preservative free, no egg protein, age 18y+ (Flublok)      5. Screening, lipid  Lipid panel    Lipid panel        Hashimoto's disease:  Chronic problem, known to provider  Has not had TSH recently  Stable on nature thyroid which was some for her    Hypertension:  Within goal today goal of less than 130/80  Recheck CMP prior to follow-up  Has not refills to make at follow-up appointment  Problem List Items Addressed This Visit       Hashimoto's disease - Primary    Overview     on NP thyroid daily         Relevant Orders    TSH    Hypertension    Relevant Orders    Comprehensive metabolic panel     Other Visit Diagnoses       Breast cancer screening by mammogram        Relevant Orders    BI mammo bilateral screening tomosynthesis    Need for influenza vaccination        Relevant Orders    Flu vaccine, trivalent, preservative free, no egg protein, age 18y+ (Flublok) (Completed)     "Screening, lipid        Relevant Orders    Lipid panel            There are no Patient Instructions on file for this visit.    Follow up: 6 months    Return precautions discussed.  An After Visit Summary was given to the patient.  All questions were answered and patient in agreement with plan.    Objective:  /72   Pulse 77   Ht 1.575 m (5' 2\")   Wt 105 kg (232 lb)   SpO2 98%   BMI 42.43 kg/m²     Physical Exam  Vitals and nursing note reviewed.   Constitutional:       General: She is not in acute distress.     Appearance: Normal appearance.   HENT:      Head: Normocephalic and atraumatic.      Mouth/Throat:      Mouth: Mucous membranes are moist.   Eyes:      General: No scleral icterus.        Right eye: No discharge.         Left eye: No discharge.      Extraocular Movements: Extraocular movements intact.      Conjunctiva/sclera: Conjunctivae normal.   Cardiovascular:      Rate and Rhythm: Normal rate and regular rhythm.   Pulmonary:      Effort: Pulmonary effort is normal. No respiratory distress.      Breath sounds: Normal breath sounds.   Musculoskeletal:      Right lower leg: No edema.      Left lower leg: No edema.   Skin:     General: Skin is warm and dry.   Neurological:      General: No focal deficit present.      Mental Status: She is alert and oriented to person, place, and time.   Psychiatric:         Attention and Perception: Attention normal.         Mood and Affect: Mood normal.         Speech: Speech normal.         Behavior: Behavior normal.         Cognition and Memory: Cognition and memory normal.         Judgment: Judgment normal.         I spent 22 minutes in total time for this visit including all related clinical activities before, during, and after the visit excluding other billable activities/procedure time.     Kaylen Devries MD    "

## 2025-01-29 ENCOUNTER — HOSPITAL ENCOUNTER (OUTPATIENT)
Dept: RADIOLOGY | Facility: CLINIC | Age: 45
Discharge: HOME | End: 2025-01-29
Payer: COMMERCIAL

## 2025-01-29 VITALS — BODY MASS INDEX: 42.69 KG/M2 | HEIGHT: 62 IN | WEIGHT: 232 LBS

## 2025-01-29 DIAGNOSIS — Z12.31 BREAST CANCER SCREENING BY MAMMOGRAM: ICD-10-CM

## 2025-01-29 PROCEDURE — 77067 SCR MAMMO BI INCL CAD: CPT

## 2025-02-01 LAB
ALBUMIN SERPL-MCNC: 4.2 G/DL (ref 3.6–5.1)
ALP SERPL-CCNC: 83 U/L (ref 31–125)
ALT SERPL-CCNC: 15 U/L (ref 6–29)
ANION GAP SERPL CALCULATED.4IONS-SCNC: 10 MMOL/L (CALC) (ref 7–17)
AST SERPL-CCNC: 16 U/L (ref 10–30)
BILIRUB SERPL-MCNC: 0.3 MG/DL (ref 0.2–1.2)
BUN SERPL-MCNC: 9 MG/DL (ref 7–25)
CALCIUM SERPL-MCNC: 9.1 MG/DL (ref 8.6–10.2)
CHLORIDE SERPL-SCNC: 103 MMOL/L (ref 98–110)
CHOLEST SERPL-MCNC: 158 MG/DL
CHOLEST/HDLC SERPL: 2.6 (CALC)
CO2 SERPL-SCNC: 24 MMOL/L (ref 20–32)
CREAT SERPL-MCNC: 0.52 MG/DL (ref 0.5–0.99)
EGFRCR SERPLBLD CKD-EPI 2021: 119 ML/MIN/1.73M2
GLUCOSE SERPL-MCNC: 84 MG/DL (ref 65–99)
HDLC SERPL-MCNC: 60 MG/DL
LDLC SERPL CALC-MCNC: 75 MG/DL (CALC)
NONHDLC SERPL-MCNC: 98 MG/DL (CALC)
POTASSIUM SERPL-SCNC: 4.1 MMOL/L (ref 3.5–5.3)
PROT SERPL-MCNC: 6.9 G/DL (ref 6.1–8.1)
SODIUM SERPL-SCNC: 137 MMOL/L (ref 135–146)
TRIGL SERPL-MCNC: 148 MG/DL
TSH SERPL-ACNC: 0.75 MIU/L

## 2025-02-04 ENCOUNTER — HOSPITAL ENCOUNTER (OUTPATIENT)
Dept: RADIOLOGY | Facility: EXTERNAL LOCATION | Age: 45
Discharge: HOME | End: 2025-02-04

## 2025-02-20 DIAGNOSIS — K58.0 IRRITABLE BOWEL SYNDROME WITH DIARRHEA: Primary | ICD-10-CM

## 2025-02-20 RX ORDER — AMITRIPTYLINE HYDROCHLORIDE 10 MG/1
10 TABLET, FILM COATED ORAL NIGHTLY
Qty: 30 TABLET | Refills: 5 | Status: SHIPPED | OUTPATIENT
Start: 2025-02-20 | End: 2026-02-20

## 2025-02-25 ENCOUNTER — APPOINTMENT (OUTPATIENT)
Dept: CARDIOLOGY | Facility: CLINIC | Age: 45
End: 2025-02-25
Payer: MEDICARE

## 2025-04-07 ENCOUNTER — APPOINTMENT (OUTPATIENT)
Dept: GASTROENTEROLOGY | Facility: CLINIC | Age: 45
End: 2025-04-07
Payer: COMMERCIAL

## 2025-04-07 VITALS
HEIGHT: 62 IN | SYSTOLIC BLOOD PRESSURE: 110 MMHG | OXYGEN SATURATION: 98 % | BODY MASS INDEX: 42.33 KG/M2 | RESPIRATION RATE: 16 BRPM | WEIGHT: 230 LBS | DIASTOLIC BLOOD PRESSURE: 75 MMHG | HEART RATE: 82 BPM

## 2025-04-07 DIAGNOSIS — K58.0 IRRITABLE BOWEL SYNDROME WITH DIARRHEA: Primary | ICD-10-CM

## 2025-04-07 PROCEDURE — 3078F DIAST BP <80 MM HG: CPT | Performed by: INTERNAL MEDICINE

## 2025-04-07 PROCEDURE — 3074F SYST BP LT 130 MM HG: CPT | Performed by: INTERNAL MEDICINE

## 2025-04-07 PROCEDURE — 1036F TOBACCO NON-USER: CPT | Performed by: INTERNAL MEDICINE

## 2025-04-07 PROCEDURE — 3008F BODY MASS INDEX DOCD: CPT | Performed by: INTERNAL MEDICINE

## 2025-04-07 PROCEDURE — 99213 OFFICE O/P EST LOW 20 MIN: CPT | Performed by: INTERNAL MEDICINE

## 2025-04-07 RX ORDER — AMITRIPTYLINE HYDROCHLORIDE 25 MG/1
25 TABLET, FILM COATED ORAL NIGHTLY
Qty: 90 TABLET | Refills: 3 | Status: SHIPPED | OUTPATIENT
Start: 2025-04-07 | End: 2026-04-07

## 2025-04-07 NOTE — PROGRESS NOTES
"Subjective   Patient ID: Ivy Diana is a 45 y.o. female who presents for Follow-up (Pt presents today as a 2 mo follow up for IBS-D for which she was taking amitriptyline and is following the fodmap diet which helped at the time. Pt states symptoms started to come back once she started reintroducing \"trigger\" foods. Pt states that the new anti-depressant that was prescribed has helped for the most part, but these last two weeks, due to at work stress, her symptoms have gotten a lot worse. ).    HPI Ivy is seen today in follow-up states since that the seen today in follow-up states since completing dose of Xifaxan diarrhea is largely nonexistent does find that the amitriptyline helps control abdominal cramping a bit of diarrhea if she has stress.  Is following a FODMAP diet is reintroducing 1 new food every 2 to 3 days has noticed no traceable food triggers.    Review of Systems    Objective   Physical Exam  Vitals and nursing note reviewed.   Constitutional:       Appearance: Normal appearance.   HENT:      Head: Normocephalic.      Mouth/Throat:      Mouth: Mucous membranes are moist.      Pharynx: Oropharynx is clear.   Eyes:      Conjunctiva/sclera: Conjunctivae normal.      Pupils: Pupils are equal, round, and reactive to light.   Cardiovascular:      Rate and Rhythm: Normal rate and regular rhythm.      Pulses: Normal pulses.      Heart sounds: Normal heart sounds.   Pulmonary:      Effort: Pulmonary effort is normal.      Breath sounds: Normal breath sounds.   Abdominal:      General: Abdomen is flat. Bowel sounds are normal.      Palpations: Abdomen is soft.   Musculoskeletal:      Cervical back: Normal range of motion and neck supple.   Skin:     General: Skin is warm and dry.   Neurological:      General: No focal deficit present.      Mental Status: She is alert and oriented to person, place, and time.   Psychiatric:         Behavior: Behavior normal.         Assessment/Plan   Diagnoses and all orders for " this visit:  Irritable bowel syndrome with diarrhea  -     amitriptyline (Elavil) 25 mg tablet; Take 1 tablet (25 mg) by mouth once daily at bedtime.       Increase amitriptyline 25 mg daily.  Call if diarrhea returns.  Advised her that she may get 4 to 6 months improvement of diarrhea with Xifaxan.  Continue to reintroduce foods follow-up in 6 months time    Claudio Huff DO 04/07/25 1:47 PM

## 2025-05-20 ENCOUNTER — OFFICE VISIT (OUTPATIENT)
Age: 45
End: 2025-05-20
Payer: COMMERCIAL

## 2025-05-20 VITALS
WEIGHT: 232 LBS | BODY MASS INDEX: 42.69 KG/M2 | HEART RATE: 73 BPM | SYSTOLIC BLOOD PRESSURE: 112 MMHG | DIASTOLIC BLOOD PRESSURE: 78 MMHG | HEIGHT: 62 IN | OXYGEN SATURATION: 98 %

## 2025-05-20 DIAGNOSIS — E28.2 PCOS (POLYCYSTIC OVARIAN SYNDROME): ICD-10-CM

## 2025-05-20 DIAGNOSIS — E66.813 CLASS 3 SEVERE OBESITY DUE TO EXCESS CALORIES WITH SERIOUS COMORBIDITY AND BODY MASS INDEX (BMI) OF 40.0 TO 44.9 IN ADULT: ICD-10-CM

## 2025-05-20 DIAGNOSIS — E06.3 HASHIMOTO'S DISEASE: ICD-10-CM

## 2025-05-20 DIAGNOSIS — M25.561 CHRONIC PAIN OF RIGHT KNEE: Primary | ICD-10-CM

## 2025-05-20 DIAGNOSIS — G89.29 CHRONIC PAIN OF RIGHT KNEE: Primary | ICD-10-CM

## 2025-05-20 DIAGNOSIS — I47.10 PSVT (PAROXYSMAL SUPRAVENTRICULAR TACHYCARDIA): ICD-10-CM

## 2025-05-20 DIAGNOSIS — I47.19 AVNRT (AV NODAL RE-ENTRY TACHYCARDIA): ICD-10-CM

## 2025-05-20 DIAGNOSIS — I10 PRIMARY HYPERTENSION: ICD-10-CM

## 2025-05-20 PROCEDURE — 3074F SYST BP LT 130 MM HG: CPT | Performed by: STUDENT IN AN ORGANIZED HEALTH CARE EDUCATION/TRAINING PROGRAM

## 2025-05-20 PROCEDURE — 3008F BODY MASS INDEX DOCD: CPT | Performed by: STUDENT IN AN ORGANIZED HEALTH CARE EDUCATION/TRAINING PROGRAM

## 2025-05-20 PROCEDURE — 3078F DIAST BP <80 MM HG: CPT | Performed by: STUDENT IN AN ORGANIZED HEALTH CARE EDUCATION/TRAINING PROGRAM

## 2025-05-20 PROCEDURE — 1036F TOBACCO NON-USER: CPT | Performed by: STUDENT IN AN ORGANIZED HEALTH CARE EDUCATION/TRAINING PROGRAM

## 2025-05-20 PROCEDURE — 99214 OFFICE O/P EST MOD 30 MIN: CPT | Performed by: STUDENT IN AN ORGANIZED HEALTH CARE EDUCATION/TRAINING PROGRAM

## 2025-05-20 RX ORDER — SEMAGLUTIDE 0.25 MG/.5ML
0.25 INJECTION, SOLUTION SUBCUTANEOUS WEEKLY
Qty: 2 ML | Refills: 0 | Status: SHIPPED | OUTPATIENT
Start: 2025-05-20 | End: 2025-06-11

## 2025-05-20 RX ORDER — METOPROLOL TARTRATE 25 MG/1
12.5 TABLET, FILM COATED ORAL DAILY
Qty: 15 TABLET | Refills: 5 | Status: SHIPPED | OUTPATIENT
Start: 2025-05-20 | End: 2025-11-16

## 2025-05-20 ASSESSMENT — PATIENT HEALTH QUESTIONNAIRE - PHQ9
SUM OF ALL RESPONSES TO PHQ9 QUESTIONS 1 AND 2: 0
2. FEELING DOWN, DEPRESSED OR HOPELESS: NOT AT ALL
1. LITTLE INTEREST OR PLEASURE IN DOING THINGS: NOT AT ALL

## 2025-05-20 NOTE — PATIENT INSTRUCTIONS
[facility] activities - google - if improves great, if not formal physical therapy

## 2025-05-20 NOTE — PROGRESS NOTES
Subjective:  Ivy Diana is a 45 y.o. female who presents to clinic today for Knee Pain (Right Knee pain and right hip pain after fall over a month ago- gets worse at bed time- wakes patient up out of sleep)      She was walking up stairs at the [facility] theater. She fell around 2 months ago. Now has persistent hip and knee pain on the right side.   Pain is directly on right knee and also feels like burning or shocking on the inside, some pain in hip and elbow.   She also has generalized stiffness. Worse later in the day.     She has found good success with optivia and did well with this.   Review of Systems    Assessment/Plan:  Ivy Diana is a 45 y.o. female with a history of PCOS, Hashimoto's disease, depression, obesity, tachycardia who presents to clinic today to address the following issues:   1. Chronic pain of right knee        2. AVNRT (AV kath re-entry tachycardia)  metoprolol tartrate (Lopressor) 25 mg tablet      3. PSVT (paroxysmal supraventricular tachycardia) (CMS-HCC)  metoprolol tartrate (Lopressor) 25 mg tablet      4. Class 3 severe obesity due to excess calories with serious comorbidity and body mass index (BMI) of 40.0 to 44.9 in adult  semaglutide, weight loss, (Wegovy) 0.25 mg/0.5 mL pen injector      5. PCOS (polycystic ovarian syndrome)  semaglutide, weight loss, (Wegovy) 0.25 mg/0.5 mL pen injector      6. Primary hypertension  semaglutide, weight loss, (Wegovy) 0.25 mg/0.5 mL pen injector      7. Hashimoto's disease  semaglutide, weight loss, (Wegovy) 0.25 mg/0.5 mL pen injector        Chronic pain of right knee:  Discussed with patient that symptoms seem most consistent with meniscal injury and we discussed options she is going to trial home physical therapy and if not improving will contact the office for referral for formal physical therapy.    AVNRT/PSVT:  Chronic problem, stable  Continue Lopressor 25    Class III severe obesity/PCOS/hypertension  Chronic problem,  "known to provider, requires further workup and management  Will have patient start Wegovy to help with weight management with the intent to help with chronic pain as well as multiple chronic diseases  Discussed risks and benefits  Discussed lifestyle modifications to augment this  Problem List Items Addressed This Visit       Hashimoto's disease    Overview   on NP thyroid daily         Relevant Medications    semaglutide, weight loss, (Wegovy) 0.25 mg/0.5 mL pen injector    PCOS (polycystic ovarian syndrome)    Relevant Medications    semaglutide, weight loss, (Wegovy) 0.25 mg/0.5 mL pen injector    Hypertension    Relevant Medications    semaglutide, weight loss, (Wegovy) 0.25 mg/0.5 mL pen injector    PSVT (paroxysmal supraventricular tachycardia) (CMS-HCC)    Relevant Medications    metoprolol tartrate (Lopressor) 25 mg tablet     Other Visit Diagnoses         Chronic pain of right knee    -  Primary      AVNRT (AV kath re-entry tachycardia)        Relevant Medications    metoprolol tartrate (Lopressor) 25 mg tablet      Class 3 severe obesity due to excess calories with serious comorbidity and body mass index (BMI) of 40.0 to 44.9 in adult        Relevant Medications    semaglutide, weight loss, (Wegovy) 0.25 mg/0.5 mL pen injector            Patient Instructions   [facility] therapy activities - google - if improves great, if not formal physical therapy      Follow up: 4 months    Return precautions discussed.  An After Visit Summary was given to the patient.  All questions were answered and patient in agreement with plan.    Objective:  /78   Pulse 73   Ht 1.575 m (5' 2\")   Wt 105 kg (232 lb)   SpO2 98%   BMI 42.43 kg/m²     Physical Exam  Vitals and nursing note reviewed.   Constitutional:       General: She is not in acute distress.     Appearance: Normal appearance. She is obese.   HENT:      Head: Normocephalic and atraumatic.      Mouth/Throat:      Mouth: Mucous membranes are moist. "   Eyes:      General: No scleral icterus.        Right eye: No discharge.         Left eye: No discharge.      Extraocular Movements: Extraocular movements intact.      Conjunctiva/sclera: Conjunctivae normal.   Pulmonary:      Effort: No respiratory distress.   Musculoskeletal:      Right elbow: Normal.      Lumbar back: No tenderness or bony tenderness. Negative right straight leg raise test and negative left straight leg raise test. No scoliosis.      Right knee: No swelling. Normal range of motion. No tenderness. No LCL laxity, MCL laxity, ACL laxity or PCL laxity. Abnormal meniscus.      Instability Tests: Anterior drawer test negative. Posterior drawer test negative. Anterior Lachman test negative.   Skin:     General: Skin is warm and dry.   Neurological:      General: No focal deficit present.      Mental Status: She is alert and oriented to person, place, and time.   Psychiatric:         Attention and Perception: Attention normal.         Mood and Affect: Mood normal.         Speech: Speech normal.         Behavior: Behavior normal.         Cognition and Memory: Cognition and memory normal.         Judgment: Judgment normal.         I spent 29 minutes in total time for this visit including all related clinical activities before, during, and after the visit excluding other billable activities/procedure time.     Kaylen Devries MD

## 2025-05-27 ENCOUNTER — PATIENT MESSAGE (OUTPATIENT)
Age: 45
End: 2025-05-27
Payer: COMMERCIAL

## 2025-05-30 ENCOUNTER — TELEPHONE (OUTPATIENT)
Age: 45
End: 2025-05-30
Payer: COMMERCIAL

## 2025-05-30 NOTE — TELEPHONE ENCOUNTER
I TRIED TO DO A T PRIOR AUTH N DIANELYS.  IT IS NOT A COVERED MEHDI FIT ON  PATIENT'S PLAN.  COPY OF DENIAL IN YOUR FOLDER.

## 2025-10-06 ENCOUNTER — APPOINTMENT (OUTPATIENT)
Dept: GASTROENTEROLOGY | Facility: CLINIC | Age: 45
End: 2025-10-06
Payer: COMMERCIAL